# Patient Record
Sex: FEMALE | Race: WHITE | NOT HISPANIC OR LATINO | Employment: UNEMPLOYED | URBAN - NONMETROPOLITAN AREA
[De-identification: names, ages, dates, MRNs, and addresses within clinical notes are randomized per-mention and may not be internally consistent; named-entity substitution may affect disease eponyms.]

---

## 2022-05-27 ENCOUNTER — HOSPITAL ENCOUNTER (INPATIENT)
Facility: HOSPITAL | Age: 37
LOS: 7 days | Discharge: HOME | DRG: 885 | End: 2022-06-03
Attending: EMERGENCY MEDICINE | Admitting: INTERNAL MEDICINE
Payer: MEDICAID

## 2022-05-27 DIAGNOSIS — F22 PARANOID DELUSION: ICD-10-CM

## 2022-05-27 DIAGNOSIS — F15.10 AMPHETAMINE ABUSE: Primary | ICD-10-CM

## 2022-05-27 LAB
ALBUMIN SERPL BCP-MCNC: 3.6 G/DL (ref 3.5–5.2)
ALP SERPL-CCNC: 50 U/L (ref 55–135)
ALT SERPL W/O P-5'-P-CCNC: 17 U/L (ref 10–44)
AMPHET+METHAMPHET UR QL: ABNORMAL
ANION GAP SERPL CALC-SCNC: 8 MMOL/L (ref 8–16)
APAP SERPL-MCNC: <2 UG/ML (ref 10–20)
AST SERPL-CCNC: 9 U/L (ref 10–40)
B-HCG UR QL: NEGATIVE
BACTERIA #/AREA URNS HPF: NEGATIVE /HPF
BARBITURATES UR QL SCN>200 NG/ML: NEGATIVE
BASOPHILS # BLD AUTO: 0.05 K/UL (ref 0–0.2)
BASOPHILS NFR BLD: 0.7 % (ref 0–1.9)
BENZODIAZ UR QL SCN>200 NG/ML: NEGATIVE
BILIRUB SERPL-MCNC: 0.2 MG/DL (ref 0.1–1)
BILIRUB UR QL STRIP: NEGATIVE
BUN SERPL-MCNC: 17 MG/DL (ref 6–20)
BZE UR QL SCN: NEGATIVE
CALCIUM SERPL-MCNC: 8 MG/DL (ref 8.7–10.5)
CANNABINOIDS UR QL SCN: NEGATIVE
CHLORIDE SERPL-SCNC: 111 MMOL/L (ref 95–110)
CLARITY UR: CLEAR
CO2 SERPL-SCNC: 21 MMOL/L (ref 23–29)
COLOR UR: YELLOW
CREAT SERPL-MCNC: 1.2 MG/DL (ref 0.5–1.4)
CREAT UR-MCNC: 264 MG/DL (ref 15–325)
CTP QC/QA: YES
DIFFERENTIAL METHOD: NORMAL
EOSINOPHIL # BLD AUTO: 0.1 K/UL (ref 0–0.5)
EOSINOPHIL NFR BLD: 0.9 % (ref 0–8)
ERYTHROCYTE [DISTWIDTH] IN BLOOD BY AUTOMATED COUNT: 12.5 % (ref 11.5–14.5)
EST. GFR  (AFRICAN AMERICAN): >60 ML/MIN/1.73 M^2
EST. GFR  (NON AFRICAN AMERICAN): 58.7 ML/MIN/1.73 M^2
ETHANOL SERPL-MCNC: <3 MG/DL
GLUCOSE SERPL-MCNC: 93 MG/DL (ref 70–110)
GLUCOSE UR QL STRIP: NEGATIVE
HCT VFR BLD AUTO: 38.8 % (ref 37–48.5)
HGB BLD-MCNC: 13 G/DL (ref 12–16)
HGB UR QL STRIP: ABNORMAL
HYALINE CASTS #/AREA URNS LPF: 10.2 /LPF
IMM GRANULOCYTES # BLD AUTO: 0.01 K/UL (ref 0–0.04)
IMM GRANULOCYTES NFR BLD AUTO: 0.1 % (ref 0–0.5)
KETONES UR QL STRIP: ABNORMAL
LEUKOCYTE ESTERASE UR QL STRIP: NEGATIVE
LYMPHOCYTES # BLD AUTO: 2.6 K/UL (ref 1–4.8)
LYMPHOCYTES NFR BLD: 34.6 % (ref 18–48)
MCH RBC QN AUTO: 29.3 PG (ref 27–31)
MCHC RBC AUTO-ENTMCNC: 33.5 G/DL (ref 32–36)
MCV RBC AUTO: 87 FL (ref 82–98)
METHADONE UR QL SCN>300 NG/ML: NEGATIVE
MICROSCOPIC COMMENT: ABNORMAL
MONOCYTES # BLD AUTO: 0.4 K/UL (ref 0.3–1)
MONOCYTES NFR BLD: 5.8 % (ref 4–15)
NEUTROPHILS # BLD AUTO: 4.4 K/UL (ref 1.8–7.7)
NEUTROPHILS NFR BLD: 57.9 % (ref 38–73)
NITRITE UR QL STRIP: NEGATIVE
NRBC BLD-RTO: 0 /100 WBC
OPIATES UR QL SCN: NEGATIVE
PCP UR QL SCN>25 NG/ML: NEGATIVE
PH UR STRIP: 5 [PH] (ref 5–8)
PLATELET # BLD AUTO: 274 K/UL (ref 150–450)
PMV BLD AUTO: 9.6 FL (ref 9.2–12.9)
POTASSIUM SERPL-SCNC: 3.8 MMOL/L (ref 3.5–5.1)
PROT SERPL-MCNC: 7 G/DL (ref 6–8.4)
PROT UR QL STRIP: ABNORMAL
RBC # BLD AUTO: 4.44 M/UL (ref 4–5.4)
RBC #/AREA URNS HPF: 3 /HPF (ref 0–4)
SARS-COV-2 RDRP RESP QL NAA+PROBE: NEGATIVE
SODIUM SERPL-SCNC: 140 MMOL/L (ref 136–145)
SP GR UR STRIP: >=1.03 (ref 1–1.03)
SQUAMOUS #/AREA URNS HPF: 3 /HPF
TOXICOLOGY INFORMATION: ABNORMAL
TSH SERPL DL<=0.005 MIU/L-ACNC: 1.13 UIU/ML (ref 0.4–4)
URN SPEC COLLECT METH UR: ABNORMAL
UROBILINOGEN UR STRIP-ACNC: 1 EU/DL
WBC # BLD AUTO: 7.63 K/UL (ref 3.9–12.7)
WBC #/AREA URNS HPF: 3 /HPF (ref 0–5)

## 2022-05-27 PROCEDURE — 63600175 PHARM REV CODE 636 W HCPCS: Performed by: EMERGENCY MEDICINE

## 2022-05-27 PROCEDURE — 96372 THER/PROPH/DIAG INJ SC/IM: CPT | Performed by: EMERGENCY MEDICINE

## 2022-05-27 PROCEDURE — 84443 ASSAY THYROID STIM HORMONE: CPT | Performed by: EMERGENCY MEDICINE

## 2022-05-27 PROCEDURE — 81000 URINALYSIS NONAUTO W/SCOPE: CPT | Mod: 59 | Performed by: EMERGENCY MEDICINE

## 2022-05-27 PROCEDURE — 80143 DRUG ASSAY ACETAMINOPHEN: CPT | Performed by: EMERGENCY MEDICINE

## 2022-05-27 PROCEDURE — 82077 ASSAY SPEC XCP UR&BREATH IA: CPT | Performed by: EMERGENCY MEDICINE

## 2022-05-27 PROCEDURE — 36415 COLL VENOUS BLD VENIPUNCTURE: CPT | Performed by: EMERGENCY MEDICINE

## 2022-05-27 PROCEDURE — 99285 EMERGENCY DEPT VISIT HI MDM: CPT | Mod: 25

## 2022-05-27 PROCEDURE — 80307 DRUG TEST PRSMV CHEM ANLYZR: CPT | Performed by: EMERGENCY MEDICINE

## 2022-05-27 PROCEDURE — 81025 URINE PREGNANCY TEST: CPT | Performed by: EMERGENCY MEDICINE

## 2022-05-27 PROCEDURE — U0002 COVID-19 LAB TEST NON-CDC: HCPCS | Performed by: EMERGENCY MEDICINE

## 2022-05-27 PROCEDURE — 80053 COMPREHEN METABOLIC PANEL: CPT | Performed by: EMERGENCY MEDICINE

## 2022-05-27 PROCEDURE — 11400000 HC PSYCH PRIVATE ROOM

## 2022-05-27 PROCEDURE — 85025 COMPLETE CBC W/AUTO DIFF WBC: CPT | Performed by: EMERGENCY MEDICINE

## 2022-05-27 RX ORDER — IBUPROFEN 200 MG
1 TABLET ORAL DAILY PRN
Status: DISCONTINUED | OUTPATIENT
Start: 2022-05-28 | End: 2022-05-28

## 2022-05-27 RX ORDER — OLANZAPINE 10 MG/1
10 TABLET ORAL EVERY 8 HOURS PRN
Status: DISCONTINUED | OUTPATIENT
Start: 2022-05-27 | End: 2022-06-03 | Stop reason: HOSPADM

## 2022-05-27 RX ORDER — LOPERAMIDE HYDROCHLORIDE 2 MG/1
2 CAPSULE ORAL
Status: DISCONTINUED | OUTPATIENT
Start: 2022-05-27 | End: 2022-06-03 | Stop reason: HOSPADM

## 2022-05-27 RX ORDER — MAG HYDROX/ALUMINUM HYD/SIMETH 200-200-20
30 SUSPENSION, ORAL (FINAL DOSE FORM) ORAL EVERY 6 HOURS PRN
Status: DISCONTINUED | OUTPATIENT
Start: 2022-05-27 | End: 2022-06-03 | Stop reason: HOSPADM

## 2022-05-27 RX ORDER — FOLIC ACID 1 MG/1
1 TABLET ORAL DAILY
Status: DISCONTINUED | OUTPATIENT
Start: 2022-05-28 | End: 2022-06-03 | Stop reason: HOSPADM

## 2022-05-27 RX ORDER — HYDROXYZINE PAMOATE 50 MG/1
50 CAPSULE ORAL NIGHTLY PRN
Status: DISCONTINUED | OUTPATIENT
Start: 2022-05-27 | End: 2022-06-03 | Stop reason: HOSPADM

## 2022-05-27 RX ORDER — ZIPRASIDONE MESYLATE 20 MG/ML
20 INJECTION, POWDER, LYOPHILIZED, FOR SOLUTION INTRAMUSCULAR
Status: COMPLETED | OUTPATIENT
Start: 2022-05-27 | End: 2022-05-27

## 2022-05-27 RX ORDER — OLANZAPINE 10 MG/2ML
10 INJECTION, POWDER, FOR SOLUTION INTRAMUSCULAR EVERY 8 HOURS PRN
Status: DISCONTINUED | OUTPATIENT
Start: 2022-05-27 | End: 2022-06-03 | Stop reason: HOSPADM

## 2022-05-27 RX ORDER — DOCUSATE SODIUM 100 MG/1
100 CAPSULE, LIQUID FILLED ORAL DAILY PRN
Status: DISCONTINUED | OUTPATIENT
Start: 2022-05-27 | End: 2022-06-03 | Stop reason: HOSPADM

## 2022-05-27 RX ORDER — ACETAMINOPHEN 325 MG/1
650 TABLET ORAL EVERY 6 HOURS PRN
Status: DISCONTINUED | OUTPATIENT
Start: 2022-05-27 | End: 2022-06-03 | Stop reason: HOSPADM

## 2022-05-27 RX ADMIN — ZIPRASIDONE MESYLATE 20 MG: 20 INJECTION, POWDER, LYOPHILIZED, FOR SOLUTION INTRAMUSCULAR at 07:05

## 2022-05-27 NOTE — ED NOTES
Pt arrived in the ED escorted by an OPC signed by her sister who reports that pt is paranoid and delusional.  Pt is reluctantly cooperative with staff.

## 2022-05-28 PROBLEM — F22 PARANOID DELUSION: Status: ACTIVE | Noted: 2022-05-28

## 2022-05-28 PROBLEM — F15.10 AMPHETAMINE ABUSE: Status: ACTIVE | Noted: 2022-05-28

## 2022-05-28 LAB
CHOLEST SERPL-MCNC: 126 MG/DL (ref 120–199)
CHOLEST/HDLC SERPL: 1.9 {RATIO} (ref 2–5)
ESTIMATED AVG GLUCOSE: 100 MG/DL (ref 68–131)
HBA1C MFR BLD: 5.1 % (ref 4–5.6)
HDLC SERPL-MCNC: 66 MG/DL (ref 40–75)
HDLC SERPL: 52.4 % (ref 20–50)
LDLC SERPL CALC-MCNC: 50.6 MG/DL (ref 63–159)
NONHDLC SERPL-MCNC: 60 MG/DL
TRIGL SERPL-MCNC: 47 MG/DL (ref 30–150)

## 2022-05-28 PROCEDURE — 83036 HEMOGLOBIN GLYCOSYLATED A1C: CPT | Performed by: INTERNAL MEDICINE

## 2022-05-28 PROCEDURE — 36415 COLL VENOUS BLD VENIPUNCTURE: CPT | Performed by: INTERNAL MEDICINE

## 2022-05-28 PROCEDURE — 25000003 PHARM REV CODE 250: Performed by: INTERNAL MEDICINE

## 2022-05-28 PROCEDURE — 80061 LIPID PANEL: CPT | Performed by: INTERNAL MEDICINE

## 2022-05-28 PROCEDURE — 25000003 PHARM REV CODE 250: Performed by: PSYCHIATRY & NEUROLOGY

## 2022-05-28 PROCEDURE — 90792 PR PSYCHIATRIC DIAGNOSTIC EVALUATION W/MEDICAL SERVICES: ICD-10-PCS | Mod: AF,HB,, | Performed by: PSYCHIATRY & NEUROLOGY

## 2022-05-28 PROCEDURE — 90792 PSYCH DIAG EVAL W/MED SRVCS: CPT | Mod: AF,HB,, | Performed by: PSYCHIATRY & NEUROLOGY

## 2022-05-28 PROCEDURE — 11400000 HC PSYCH PRIVATE ROOM

## 2022-05-28 RX ORDER — HYDROXYZINE PAMOATE 25 MG/1
25 CAPSULE ORAL EVERY 8 HOURS PRN
Status: DISCONTINUED | OUTPATIENT
Start: 2022-05-28 | End: 2022-06-03 | Stop reason: HOSPADM

## 2022-05-28 RX ORDER — ESCITALOPRAM OXALATE 5 MG/1
5 TABLET ORAL DAILY
Status: DISCONTINUED | OUTPATIENT
Start: 2022-05-28 | End: 2022-05-30

## 2022-05-28 RX ADMIN — HYDROXYZINE PAMOATE 50 MG: 50 CAPSULE ORAL at 08:05

## 2022-05-28 RX ADMIN — FOLIC ACID 1 MG: 1 TABLET ORAL at 08:05

## 2022-05-28 RX ADMIN — THERA TABS 1 TABLET: TAB at 08:05

## 2022-05-28 RX ADMIN — ESCITALOPRAM OXALATE 5 MG: 5 TABLET, FILM COATED ORAL at 12:05

## 2022-05-28 NOTE — PLAN OF CARE
POC reviewed this shift and is on going. Patient is withdrawn, depressed, anxious, irritable, showing pressured speech, and paranoid. Endorses Auditory Hallucinations, Visual Hallucinations, and Delusions. Denies Suicidal Ideation, Homicidal Ideation, Tactile Hallucinations, and Gustatory Hallucinations. Isolating to self,minimal interaction with peers. Pt continues to be medication compliant and staff will continue to monitor pt closely while on the unit.

## 2022-05-28 NOTE — SUBJECTIVE & OBJECTIVE
History reviewed. No pertinent past medical history.    History reviewed. No pertinent surgical history.    Review of patient's allergies indicates:  No Known Allergies    No current facility-administered medications on file prior to encounter.     No current outpatient medications on file prior to encounter.     Family History    None       Tobacco Use    Smoking status: Never Smoker    Smokeless tobacco: Never Used   Substance and Sexual Activity    Alcohol use: Not on file    Drug use: Not on file    Sexual activity: Not on file     Review of Systems   Unable to perform ROS: Psychiatric disorder   Objective:     Vital Signs (Most Recent):  Temp: 98.2 °F (36.8 °C) (05/28/22 0747)  Pulse: (!) 58 (05/28/22 0747)  Resp: 20 (05/28/22 0747)  BP: (!) 87/52 (05/28/22 0747)  SpO2: 100 % (05/28/22 0747)   Vital Signs (24h Range):  Temp:  [96.8 °F (36 °C)-98.2 °F (36.8 °C)] 98.2 °F (36.8 °C)  Pulse:  [58-86] 58  Resp:  [14-20] 20  SpO2:  [98 %-100 %] 100 %  BP: ()/(52-86) 87/52     Weight: 54.4 kg (119 lb 14.9 oz)  Body mass index is 20.59 kg/m².    Physical Exam  Vitals and nursing note reviewed.   HENT:      Head: Normocephalic and atraumatic.      Nose: No congestion or rhinorrhea.      Mouth/Throat:      Mouth: Mucous membranes are moist.      Pharynx: Oropharynx is clear.   Eyes:      Extraocular Movements: Extraocular movements intact.   Cardiovascular:      Rate and Rhythm: Bradycardia present.   Pulmonary:      Effort: No respiratory distress.      Breath sounds: No wheezing or rhonchi.   Abdominal:      General: Bowel sounds are normal.      Palpations: Abdomen is soft.   Skin:     General: Skin is warm and dry.   Neurological:      General: No focal deficit present.      Mental Status: She is alert. Mental status is at baseline.           Significant Labs: All pertinent labs within the past 24 hours have been reviewed.  Recent Lab Results  (Last 5 results in the past 24 hours)        05/28/22  0531    05/27/22 2058 05/27/22 2046 05/27/22 2014 05/27/22 2007        Benzodiazepines         Negative       Methadone metabolites         Negative       Phencyclidine         Negative       Acetaminophen (Tylenol), Serum       <2.0  Comment: Toxic Levels:  Adults (4 hr post-ingestion).........>150 ug/mL  Adults (12 hr post-ingestion)........>40 ug/mL  Peds (2 hr post-ingestion, liquid)...>225 ug/mL           Albumin       3.6         Alcohol, Serum       <3         Alkaline Phosphatase       50         ALT       17         Amphetamine Screen, Ur         Presumptive Positive       Anion Gap       8         Appearance, UA         Clear       AST       9         Bacteria, UA         Negative       Barbiturate Screen, Ur         Negative       Baso #       0.05         Basophil %       0.7         Bilirubin (UA)         Negative       BILIRUBIN TOTAL       0.2  Comment: For infants and newborns, interpretation of results should be based  on gestational age, weight and in agreement with clinical  observations.    Premature Infant recommended reference ranges:  Up to 24 hours.............<8.0 mg/dL  Up to 48 hours............<12.0 mg/dL  3-5 days..................<15.0 mg/dL  6-29 days.................<15.0 mg/dL    For patients on Eltrombopag therapy, use of Dimension [place] TBIL is   not   recommended.           BUN       17         Calcium       8.0         Chloride       111         Cholesterol 126  Comment: The National Cholesterol Education Program (NCEP) has set the  following guidelines (reference ranges) for Cholesterol:  Optimal.....................<200 mg/dL  Borderline High.............200-239 mg/dL  High........................> or = 240 mg/dL                 CO2       21         Cocaine (Metab.)         Negative       Color, UA         Yellow       Creatinine       1.2         Creatinine, Urine         264.0       Differential Method       Automated         eGFR if        >60.0          eGFR if non        58.7  Comment: Calculation used to obtain the estimated glomerular filtration  rate (eGFR) is the CKD-EPI equation.            Eos #       0.1         Eosinophil %       0.9         Estimated Avg Glucose 100               Glucose       93         Glucose, UA         Negative       Gran # (ANC)       4.4         Gran %       57.9         HDL 66  Comment: The National Cholesterol Education Program (NCEP) has set the  following guidelines (reference values) for HDL Cholesterol:  Low...............<40 mg/dL  Optimal...........>60 mg/dL                 HDL/Cholesterol Ratio 52.4               Hematocrit       38.8         Hemoglobin       13.0         Hemoglobin A1C External 5.1  Comment: ADA Screening Guidelines:  5.7-6.4%  Consistent with prediabetes  >or=6.5%  Consistent with diabetes    High levels of fetal hemoglobin interfere with the HbA1C  assay. Heterozygous hemoglobin variants (HbS, HgC, etc)do  not significantly interfere with this assay.   However, presence of multiple variants may affect accuracy.                 Hyaline Casts, UA         10.2       Immature Grans (Abs)       0.01  Comment: Mild elevation in immature granulocytes is non specific and   can be seen in a variety of conditions including stress response,   acute inflammation, trauma and pregnancy. Correlation with other   laboratory and clinical findings is essential.           Immature Granulocytes       0.1         Ketones, UA         Trace       LDL Cholesterol External 50.6  Comment: The National Cholesterol Education Program (NCEP) has set the  following guidelines (reference values) for LDL Cholesterol:  Optimal.......................<130 mg/dL  Borderline High...............130-159 mg/dL  High..........................160-189 mg/dL  Very High.....................>190 mg/dL                 Leukocytes, UA         Negative       Lymph #       2.6         Lymph %       34.6         MCH       29.3         MCHC        33.5         MCV       87         Microscopic Comment         SEE COMMENT  Comment: Other formed elements not mentioned in the report are not   present in the microscopic examination.          Mono #       0.4         Mono %       5.8         MPV       9.6         NITRITE UA         Negative       Non-HDL Cholesterol 60  Comment: Risk category and Non-HDL cholesterol goals:  Coronary heart disease (CHD)or equivalent (10-year risk of CHD >20%):  Non-HDL cholesterol goal     <130 mg/dL  Two or more CHD risk factors and 10-year risk of CHD <= 20%:  Non-HDL cholesterol goal     <160 mg/dL  0 to 1 CHD risk factor:  Non-HDL cholesterol goal     <190 mg/dL                 nRBC       0         Occult Blood UA         Trace       Opiate Scrn, Ur         Negative       pH, UA         5.0       Platelets       274         Potassium       3.8         Preg Test, Ur   Negative             PROTEIN TOTAL       7.0         Protein, UA         1+  Comment: Recommend a 24 hour urine protein or a urine   protein/creatinine ratio if globulin induced proteinuria is  clinically suspected.          Acceptable     Yes           RBC       4.44         RBC, UA         3       RDW       12.5         SARS-CoV-2 RNA, Amplification, Qual     Negative           Sodium       140         Specific Gravity, UA         >=1.030       Specimen UA         Urine, Clean Catch       Squam Epithel, UA         3       Marijuana (THC) Metabolite         Negative       Total Cholesterol/HDL Ratio 1.9               Toxicology Information         SEE COMMENT  Comment: This screen includes the following classes of drugs at the   listed cut-off:    Benzodiazepines                  200 ng/ml  Methadone                        300 ng/ml  Cocaine metabolite               300 ng/ml  Opiates                         2000 ng/ml  Barbiturates                     200 ng/ml  Amphetamines                    1000 ng/ml  Marijuana metabs (THC)            50  ng/ml  Phencyclidine (PCP)               25 ng/ml    **Intended use : The UDS methods provide only a preliminary result.    A more   specific alternate chemical method must be used in order to obtain a   confirmed analytical result.  Gas chromatography mass spectrometry   (GC/MS)   is the preferred confirmatory method.  Clinical consideration and   professional judgement should be applied to any drug of abuse test   result.    Particularly when preliminary results are used.       ** Results:  Positive results are only preliminary and only suggest   the   sample may contain the analyte being tested.  Negative results   indicate that   the sample does not contain the analyte or the analyte is present in   concentrations below the cut-off level.         Triglycerides 47  Comment: The National Cholesterol Education Program (NCEP) has set the  following guidelines (reference values) for triglycerides:  Normal......................<150 mg/dL  Borderline High.............150-199 mg/dL  High........................200-499 mg/dL                 TSH       1.130  Comment: ATTENTION: The use of Biotin Supplements may interfere with this   assay.           UROBILINOGEN UA         1.0       WBC, UA         3       WBC       7.63                                Significant Imaging: I have reviewed all pertinent imaging results/findings within the past 24 hours.

## 2022-05-28 NOTE — PLAN OF CARE
35 y/o F OPC'ed by sister and was brought to [facility] ED.   Her sister reports to ED staff that she suspects pt is doing drugs and has a hx of substance abuse and anxiety. Pt denies hx of psych and substance abuse. + meth only. She went to an OP clinic x1 and did not go back. She is paranoid and delusional hearing her son crying and other voices. Pt son was taken from her by his father who she was supposed to be sharing custody with in [place] where she has been living for several yrs. Came back to [place] in January with her sister. Sister stated she threatened to harm their mother and she do not know if she would.    Pt cooperative when she came on the unit. Proscan was performed by Franky magaña and Shaista with neg findings. ED tech accompanied them in transferring pt to [facility].
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
## 2022-05-28 NOTE — H&P
[facility]) Hospital Medicine  History & Physical    Patient Name: Kristina Vizcarra  MRN: [id number]  Patient Class: IP- Psych  Admission Date: 5/27/2022  Attending Physician: Gurmeet Lobato DO   Primary Care Provider: Primary Doctor No         Patient information was obtained from patient and ER records.     Subjective:     Principal Problem:<principal problem not specified>    Chief Complaint:   Chief Complaint   Patient presents with    Psychiatric Evaluation     Pt arrived escorted by Noah Snyder [facility] Deputy who executed an OPC.  Pt denies mental health problems at this time and is minimally cooperative with staff.           HPI: Kristina Vizcarra is a 36 y.o. female with PMHX of anxiety who presents to the emergency department C/O psychiatric disease.  Patient brought in under an order of protective custody.  Patient denied any suicide ideation homicide ideation, had denied any paranoid symptoms, patient reported to ED staff that she was living in [place] with she lost custody of her son, moved to [place] to be near her family, patient's sister reported that she had been acting paranoid, thinks that the police an FBI after her, patient had endorsed to family members having auditory hallucination, threatened her mother.  Patient admitted for psychiatric evaluation and therapy      History reviewed. No pertinent past medical history.    History reviewed. No pertinent surgical history.    Review of patient's allergies indicates:  No Known Allergies    No current facility-administered medications on file prior to encounter.     No current outpatient medications on file prior to encounter.     Family History    None       Tobacco Use    Smoking status: Never Smoker    Smokeless tobacco: Never Used   Substance and Sexual Activity    Alcohol use: Not on file    Drug use: Not on file    Sexual activity: Not on file     Review of Systems   Unable to perform ROS:  Psychiatric disorder   Objective:     Vital Signs (Most Recent):  Temp: 98.2 °F (36.8 °C) (05/28/22 0747)  Pulse: (!) 58 (05/28/22 0747)  Resp: 20 (05/28/22 0747)  BP: (!) 87/52 (05/28/22 0747)  SpO2: 100 % (05/28/22 0747)   Vital Signs (24h Range):  Temp:  [96.8 °F (36 °C)-98.2 °F (36.8 °C)] 98.2 °F (36.8 °C)  Pulse:  [58-86] 58  Resp:  [14-20] 20  SpO2:  [98 %-100 %] 100 %  BP: ()/(52-86) 87/52     Weight: 54.4 kg (119 lb 14.9 oz)  Body mass index is 20.59 kg/m².    Physical Exam  Vitals and nursing note reviewed.   HENT:      Head: Normocephalic and atraumatic.      Nose: No congestion or rhinorrhea.      Mouth/Throat:      Mouth: Mucous membranes are moist.      Pharynx: Oropharynx is clear.   Eyes:      Extraocular Movements: Extraocular movements intact.   Cardiovascular:      Rate and Rhythm: Bradycardia present.   Pulmonary:      Effort: No respiratory distress.      Breath sounds: No wheezing or rhonchi.   Abdominal:      General: Bowel sounds are normal.      Palpations: Abdomen is soft.   Skin:     General: Skin is warm and dry.   Neurological:      General: No focal deficit present.      Mental Status: She is alert. Mental status is at baseline.           Significant Labs: All pertinent labs within the past 24 hours have been reviewed.  Recent Lab Results  (Last 5 results in the past 24 hours)        05/28/22  0557   05/27/22 2058 05/27/22 2046 05/27/22 2014 05/27/22  2007        Benzodiazepines         Negative       Methadone metabolites         Negative       Phencyclidine         Negative       Acetaminophen (Tylenol), Serum       <2.0  Comment: Toxic Levels:  Adults (4 hr post-ingestion).........>150 ug/mL  Adults (12 hr post-ingestion)........>40 ug/mL  Peds (2 hr post-ingestion, liquid)...>225 ug/mL           Albumin       3.6         Alcohol, Serum       <3         Alkaline Phosphatase       50         ALT       17         Amphetamine Screen, Ur         Presumptive Positive        Anion Gap       8         Appearance, UA         Clear       AST       9         Bacteria, UA         Negative       Barbiturate Screen, Ur         Negative       Baso #       0.05         Basophil %       0.7         Bilirubin (UA)         Negative       BILIRUBIN TOTAL       0.2  Comment: For infants and newborns, interpretation of results should be based  on gestational age, weight and in agreement with clinical  observations.    Premature Infant recommended reference ranges:  Up to 24 hours.............<8.0 mg/dL  Up to 48 hours............<12.0 mg/dL  3-5 days..................<15.0 mg/dL  6-29 days.................<15.0 mg/dL    For patients on Eltrombopag therapy, use of Dimension Stanley TBIL is   not   recommended.           BUN       17         Calcium       8.0         Chloride       111         Cholesterol 126  Comment: The National Cholesterol Education Program (NCEP) has set the  following guidelines (reference ranges) for Cholesterol:  Optimal.....................<200 mg/dL  Borderline High.............200-239 mg/dL  High........................> or = 240 mg/dL                 CO2       21         Cocaine (Metab.)         Negative       Color, UA         Yellow       Creatinine       1.2         Creatinine, Urine         264.0       Differential Method       Automated         eGFR if        >60.0         eGFR if non        58.7  Comment: Calculation used to obtain the estimated glomerular filtration  rate (eGFR) is the CKD-EPI equation.            Eos #       0.1         Eosinophil %       0.9         Estimated Avg Glucose 100               Glucose       93         Glucose, UA         Negative       Gran # (ANC)       4.4         Gran %       57.9         HDL 66  Comment: The National Cholesterol Education Program (NCEP) has set the  following guidelines (reference values) for HDL Cholesterol:  Low...............<40 mg/dL  Optimal...........>60 mg/dL                  HDL/Cholesterol Ratio 52.4               Hematocrit       38.8         Hemoglobin       13.0         Hemoglobin A1C External 5.1  Comment: ADA Screening Guidelines:  5.7-6.4%  Consistent with prediabetes  >or=6.5%  Consistent with diabetes    High levels of fetal hemoglobin interfere with the HbA1C  assay. Heterozygous hemoglobin variants (HbS, HgC, etc)do  not significantly interfere with this assay.   However, presence of multiple variants may affect accuracy.                 Hyaline Casts, UA         10.2       Immature Grans (Abs)       0.01  Comment: Mild elevation in immature granulocytes is non specific and   can be seen in a variety of conditions including stress response,   acute inflammation, trauma and pregnancy. Correlation with other   laboratory and clinical findings is essential.           Immature Granulocytes       0.1         Ketones, UA         Trace       LDL Cholesterol External 50.6  Comment: The National Cholesterol Education Program (NCEP) has set the  following guidelines (reference values) for LDL Cholesterol:  Optimal.......................<130 mg/dL  Borderline High...............130-159 mg/dL  High..........................160-189 mg/dL  Very High.....................>190 mg/dL                 Leukocytes, UA         Negative       Lymph #       2.6         Lymph %       34.6         MCH       29.3         MCHC       33.5         MCV       87         Microscopic Comment         SEE COMMENT  Comment: Other formed elements not mentioned in the report are not   present in the microscopic examination.          Mono #       0.4         Mono %       5.8         MPV       9.6         NITRITE UA         Negative       Non-HDL Cholesterol 60  Comment: Risk category and Non-HDL cholesterol goals:  Coronary heart disease (CHD)or equivalent (10-year risk of CHD >20%):  Non-HDL cholesterol goal     <130 mg/dL  Two or more CHD risk factors and 10-year risk of CHD <= 20%:  Non-HDL cholesterol goal      <160 mg/dL  0 to 1 CHD risk factor:  Non-HDL cholesterol goal     <190 mg/dL                 nRBC       0         Occult Blood UA         Trace       Opiate Scrn, Ur         Negative       pH, UA         5.0       Platelets       274         Potassium       3.8         Preg Test, Ur   Negative             PROTEIN TOTAL       7.0         Protein, UA         1+  Comment: Recommend a 24 hour urine protein or a urine   protein/creatinine ratio if globulin induced proteinuria is  clinically suspected.          Acceptable     Yes           RBC       4.44         RBC, UA         3       RDW       12.5         SARS-CoV-2 RNA, Amplification, Qual     Negative           Sodium       140         Specific Gravity, UA         >=1.030       Specimen UA         Urine, Clean Catch       Squam Epithel, UA         3       Marijuana (THC) Metabolite         Negative       Total Cholesterol/HDL Ratio 1.9               Toxicology Information         SEE COMMENT  Comment: This screen includes the following classes of drugs at the   listed cut-off:    Benzodiazepines                  200 ng/ml  Methadone                        300 ng/ml  Cocaine metabolite               300 ng/ml  Opiates                         2000 ng/ml  Barbiturates                     200 ng/ml  Amphetamines                    1000 ng/ml  Marijuana metabs (THC)            50 ng/ml  Phencyclidine (PCP)               25 ng/ml    **Intended use : The UDS methods provide only a preliminary result.    A more   specific alternate chemical method must be used in order to obtain a   confirmed analytical result.  Gas chromatography mass spectrometry   (GC/MS)   is the preferred confirmatory method.  Clinical consideration and   professional judgement should be applied to any drug of abuse test   result.    Particularly when preliminary results are used.       ** Results:  Positive results are only preliminary and only suggest   the   sample may contain the  analyte being tested.  Negative results   indicate that   the sample does not contain the analyte or the analyte is present in   concentrations below the cut-off level.         Triglycerides 47  Comment: The National Cholesterol Education Program (NCEP) has set the  following guidelines (reference values) for triglycerides:  Normal......................<150 mg/dL  Borderline High.............150-199 mg/dL  High........................200-499 mg/dL                 TSH       1.130  Comment: ATTENTION: The use of Biotin Supplements may interfere with this   assay.           UROBILINOGEN UA         1.0       WBC, UA         3       WBC       7.63                                Significant Imaging: I have reviewed all pertinent imaging results/findings within the past 24 hours.    Assessment/Plan:     Amphetamine abuse  Counseled on cessation      Paranoid delusion  Patient admitted for psychiatric evaluation and medication management, currently PECd, continue therapy in medication per psych        VTE Risk Mitigation (From admission, onward)         Ordered     IP VTE LOW RISK PATIENT  Once         05/27/22 2050                   Layo Patel MD  Department of Hospital Medicine   [facility] ([facility])

## 2022-05-28 NOTE — ED PROVIDER NOTES
EMERGENCY DEPARTMENT HISTORY AND PHYSICAL EXAM          Date: 5/27/2022   Patient Name: Kristina Vizcarra       History of Presenting Illness           Chief Complaint   Patient presents with    Psychiatric Evaluation     Pt arrived escorted by Noah [facility] Deputy who executed an OPC.  Pt denies mental health problems at this time and is minimally cooperative with staff.            History Provided By: Patient, Family Member and OPC    1908   Kristina Vizcarra is a 36 y.o. female with PMHX of anxiety who presents to the emergency department C/O psychiatric disease.    Patient arrives to the emergency department under an order of protective custody followed by her sister    In the emergency department patient is not very forthcoming.  She states she is not sure what her sister followed at paperwork.  She denies to me suicidal ideation or homicidal ideation.  She denies any paranoia.  She tells me that she was living in [place] for the past several years and that her son was taken by his father who she is supposed to share custody with.  She states the flight to [place] and she has not seen her son.  She moved to this area in January and states she is originally from [place].  She tells me she lives with her uncle and typically has been living with various family members.  Her sister who followed the paperwork she saw this afternoon and she says she last saw her about 3 days ago.  She denies any drug or alcohol use.  She denies history of seen a psychiatrist or any psychiatric hospitalizations.    Per patient's sister, Cate, the patient has been staying with her since January.  She says that she got her sister home from [place] the beginning of this year.  She is not sure what she was doing there and that the patient originally went out there because of her son and his father prior to her son being taken away.  She tells me that the patient is frequently paranoid.  She states that she thinks the  police and the FBI are after her.  She told her the other day that someone put bugs in her ear during her sleep.  She tells me that the patient frequently hears her son crying or other voices are sounds that are not there.  She tells me that she has threatened their mother but is not sure if she would act on these threats.  She tells me that the patient had appointment with [facility] and the patient went for the initial visit but will no longer go back.  She suspects substance abuse reports patient has had a prior history of this.  She tells me that 2 of their aunt's suffer from schizophrenia.      PCP: Primary Doctor No        No current facility-administered medications for this encounter.     No current outpatient medications on file.           Past History     Past Medical History:   No past medical history on file.     Past Surgical History:   No past surgical history on file.     Family History:   No family history on file.     Social History:         Allergies:   Review of patient's allergies indicates:  No Known Allergies       Review of Systems   Review of Systems   Constitutional: Negative for fever.   HENT: Negative for sore throat.    Respiratory: Negative for shortness of breath.    Cardiovascular: Negative for chest pain.   Gastrointestinal: Negative for nausea.   Genitourinary: Negative for dysuria.   Musculoskeletal: Negative for back pain.   Skin: Negative for rash.   Neurological: Negative for weakness.   Hematological: Does not bruise/bleed easily.   Psychiatric/Behavioral: Negative for agitation, decreased concentration, dysphoric mood, hallucinations, self-injury, sleep disturbance and suicidal ideas. The patient is not nervous/anxious and is not hyperactive.    All other systems reviewed and are negative.               Physical Exam     Vitals:    05/27/22 1852 05/27/22 1900   BP: 113/66    BP Location: Right arm    Patient Position: Sitting    Pulse: 86    Resp: 18    Temp: 97.4 °F  "(36.3 °C)    TempSrc: Oral    SpO2: 98%    Weight:  54.4 kg (120 lb)   Height:  5' 4" (1.626 m)      Physical Exam  Vitals and nursing note reviewed.   Constitutional:       General: She is not in acute distress.     Appearance: Normal appearance. She is not ill-appearing.   HENT:      Head: Normocephalic and atraumatic.      Nose: Nose normal. No congestion or rhinorrhea.      Mouth/Throat:      Mouth: Mucous membranes are moist.   Eyes:      Extraocular Movements: Extraocular movements intact.      Pupils: Pupils are equal, round, and reactive to light.   Cardiovascular:      Rate and Rhythm: Regular rhythm.   Pulmonary:      Effort: Pulmonary effort is normal. No respiratory distress.   Musculoskeletal:         General: No tenderness or deformity. Normal range of motion.      Cervical back: Normal range of motion.   Skin:     General: Skin is warm and dry.   Neurological:      General: No focal deficit present.      Mental Status: She is alert and oriented to person, place, and time. Mental status is at baseline.   Psychiatric:         Attention and Perception: Attention and perception normal.         Mood and Affect: Mood normal. Affect is labile.         Speech: Speech normal.         Behavior: Behavior is cooperative.         Thought Content: Thought content does not include homicidal or suicidal ideation.         Cognition and Memory: Cognition normal.      Comments: Patient not very forthcoming.  Denies to me all concerns expressed to her by her sister.              Diagnostic Study Results      Labs -   Recent Results (from the past 12 hour(s))   Urinalysis, Reflex to Urine Culture Urine, Clean Catch    Collection Time: 05/27/22  8:07 PM    Specimen: Urine, Clean Catch   Result Value Ref Range    Specimen UA Urine, Clean Catch     Color, UA Yellow Yellow, Straw, Erika    Appearance, UA Clear Clear    pH, UA 5.0 5.0 - 8.0    Specific Gravity, UA >=1.030 (A) 1.005 - 1.030    Protein, UA 1+ (A) Negative    " Glucose, UA Negative Negative    Ketones, UA Trace (A) Negative    Bilirubin (UA) Negative Negative    Occult Blood UA Trace (A) Negative    Nitrite, UA Negative Negative    Urobilinogen, UA 1.0 <2.0 EU/dL    Leukocytes, UA Negative Negative   Drug screen panel, emergency    Collection Time: 05/27/22  8:07 PM   Result Value Ref Range    Benzodiazepines Negative Negative    Methadone metabolites Negative Negative    Cocaine (Metab.) Negative Negative    Opiate Scrn, Ur Negative Negative    Barbiturate Screen, Ur Negative Negative    Amphetamine Screen, Ur Presumptive Positive (A) Negative    THC Negative Negative    Phencyclidine Negative Negative    Creatinine, Urine 264.0 15.0 - 325.0 mg/dL    Toxicology Information SEE COMMENT    Urinalysis Microscopic    Collection Time: 05/27/22  8:07 PM   Result Value Ref Range    RBC, UA 3 0 - 4 /hpf    WBC, UA 3 0 - 5 /hpf    Bacteria Negative None-Occ /hpf    Squam Epithel, UA 3 /hpf    Hyaline Casts, UA 10.2 (A) 0-1/lpf /lpf    Microscopic Comment SEE COMMENT    CBC auto differential    Collection Time: 05/27/22  8:14 PM   Result Value Ref Range    WBC 7.63 3.90 - 12.70 K/uL    RBC 4.44 4.00 - 5.40 M/uL    Hemoglobin 13.0 12.0 - 16.0 g/dL    Hematocrit 38.8 37.0 - 48.5 %    MCV 87 82 - 98 fL    MCH 29.3 27.0 - 31.0 pg    MCHC 33.5 32.0 - 36.0 g/dL    RDW 12.5 11.5 - 14.5 %    Platelets 274 150 - 450 K/uL    MPV 9.6 9.2 - 12.9 fL    Immature Granulocytes 0.1 0.0 - 0.5 %    Gran # (ANC) 4.4 1.8 - 7.7 K/uL    Immature Grans (Abs) 0.01 0.00 - 0.04 K/uL    Lymph # 2.6 1.0 - 4.8 K/uL    Mono # 0.4 0.3 - 1.0 K/uL    Eos # 0.1 0.0 - 0.5 K/uL    Baso # 0.05 0.00 - 0.20 K/uL    nRBC 0 0 /100 WBC    Gran % 57.9 38.0 - 73.0 %    Lymph % 34.6 18.0 - 48.0 %    Mono % 5.8 4.0 - 15.0 %    Eosinophil % 0.9 0.0 - 8.0 %    Basophil % 0.7 0.0 - 1.9 %    Differential Method Automated    Comprehensive metabolic panel    Collection Time: 05/27/22  8:14 PM   Result Value Ref Range    Sodium 140  136 - 145 mmol/L    Potassium 3.8 3.5 - 5.1 mmol/L    Chloride 111 (H) 95 - 110 mmol/L    CO2 21 (L) 23 - 29 mmol/L    Glucose 93 70 - 110 mg/dL    BUN 17 6 - 20 mg/dL    Creatinine 1.2 0.5 - 1.4 mg/dL    Calcium 8.0 (L) 8.7 - 10.5 mg/dL    Total Protein 7.0 6.0 - 8.4 g/dL    Albumin 3.6 3.5 - 5.2 g/dL    Total Bilirubin 0.2 0.1 - 1.0 mg/dL    Alkaline Phosphatase 50 (L) 55 - 135 U/L    AST 9 (L) 10 - 40 U/L    ALT 17 10 - 44 U/L    Anion Gap 8 8 - 16 mmol/L    eGFR if African American >60.0 >60 mL/min/1.73 m^2    eGFR if non  58.7 (A) >60 mL/min/1.73 m^2   TSH    Collection Time: 05/27/22  8:14 PM   Result Value Ref Range    TSH 1.130 0.400 - 4.000 uIU/mL   Ethanol    Collection Time: 05/27/22  8:14 PM   Result Value Ref Range    Alcohol, Serum <3 <10 mg/dL   Acetaminophen level    Collection Time: 05/27/22  8:14 PM   Result Value Ref Range    Acetaminophen (Tylenol), Serum <2.0 (L) 10.0 - 20.0 ug/mL   POCT COVID-19 Rapid Screening    Collection Time: 05/27/22  8:46 PM   Result Value Ref Range    POC Rapid COVID Negative Negative     Acceptable Yes         Radiologic Studies -    No orders to display        Medications given in the ED-   Medications   ziprasidone injection 20 mg (20 mg Intramuscular Given 5/27/22 1944)           Medical Decision Making    I am the first provider for this patient.     I reviewed the vital signs, available nursing notes, past medical history, past surgical history, family history and social history.     Vital Signs:  Reviewed the patient's vital signs.     Pulse Oximetry Analysis and Interpretation:    98% on Room Air, normal         Records Reviewed: Nursing notes.        Provider Notes (Medical Decision Making): Kristina Vizcarra is a 36 y.o. female here for reported psychiatric illness and paranoia    Patient not very forthcoming with me and denies everything.  She does not want to be here.    Reviewed OPC paperwork and spoke extensively the patient's  sister, Cate, on the phone .  Cate reports that she has been trying to help her sister but no longer knows what to do.  She tells me the patient is unwilling to seek outpatient care and expresses paranoia especially against authority figures, police officers, and taking any sort of medications.      Cate reports concerns about the patient being around her children in that the patient has threatened her mother.  She reports she is under sure if patient actually act on these threats.    Patient's sister seems appropriate, provides a cohesive history, and expresses significant concern for the patients well-being.  Her OPC seems to be filled in good kanika and she appears to be a reliable historian.  As patient seems unable to seek care for herself and appears to be gravely disabled I will plan on putting patient under a protective custody order      Procedures:   Procedures      ED Course:    8:46 PM  Patient is medically cleared for Psych placement  Labs unremarkable, UDS + amphetamines        CONSULT NOTE:    8:51 PM      Dr. Tao discussed care with?Dr Lobato, Psych   It was a standard discussion,?including history of patients chief complaint, available diagnostic results, and treatment course.?Discussed case. Agrees with admission.              Diagnosis and Disposition       CLINICAL IMPRESSION:         1. Amphetamine abuse    2. Paranoid delusion              PLAN:   1. Admit to Hospital  2.      Medication List      You have not been prescribed any medications.        3. No follow-up provider specified.     _______________________________     Please note that this dictation was completed with [employer], the computer voice recognition software.  Quite often unanticipated grammatical, syntax, homophones, and other interpretive errors are inadvertently transcribed by the computer software.  Please disregard these errors.  Please excuse any errors that have escaped final proofreading.             Israel KIM  MD [place]  05/27/22 9598

## 2022-05-28 NOTE — HPI
Kristina Vizcarra is a 36 y.o. female with PMHX of anxiety who presents to the emergency department C/O psychiatric disease.  Patient brought in under an order of protective custody.  Patient denied any suicide ideation homicide ideation, had denied any paranoid symptoms, patient reported to ED staff that she was living in [place] with she lost custody of her son, moved to [place] to be near her family, patient's sister reported that she had been acting paranoid, thinks that the police an FBI after her, patient had endorsed to family members having auditory hallucination, threatened her mother.  Patient admitted for psychiatric evaluation and therapy

## 2022-05-28 NOTE — ED NOTES
Pt notified staff of desire to cooperate with the process.  After carefully reading the PEC, pt states that she does abuse drugs (meth) and alcohol.  Although she denies mental illness, pt states that if substance abuse if a reason for PEC she does have those issues and will therefore cooperate with the process.

## 2022-05-28 NOTE — ASSESSMENT & PLAN NOTE
Patient admitted for psychiatric evaluation and medication management, currently PECd, continue therapy in medication per psych

## 2022-05-29 PROCEDURE — 99232 SBSQ HOSP IP/OBS MODERATE 35: CPT | Mod: AF,HB,, | Performed by: PSYCHIATRY & NEUROLOGY

## 2022-05-29 PROCEDURE — 11400000 HC PSYCH PRIVATE ROOM

## 2022-05-29 PROCEDURE — 25000003 PHARM REV CODE 250: Performed by: INTERNAL MEDICINE

## 2022-05-29 PROCEDURE — 99232 PR SUBSEQUENT HOSPITAL CARE,LEVL II: ICD-10-PCS | Mod: AF,HB,, | Performed by: PSYCHIATRY & NEUROLOGY

## 2022-05-29 RX ADMIN — ESCITALOPRAM OXALATE 5 MG: 5 TABLET, FILM COATED ORAL at 08:05

## 2022-05-29 RX ADMIN — FOLIC ACID 1 MG: 1 TABLET ORAL at 08:05

## 2022-05-29 RX ADMIN — THERA TABS 1 TABLET: TAB at 08:05

## 2022-05-29 RX ADMIN — HYDROXYZINE PAMOATE 50 MG: 50 CAPSULE ORAL at 08:05

## 2022-05-29 NOTE — PLAN OF CARE
POC reviewed this shift and is ongoing.  Pt cooperative this shift with staff and interacts with peers.   Pt took meds with no adverse reactions. Pt denies SI/HI, A/V hallucinations. Will continue to monitor for changes and safety.

## 2022-05-29 NOTE — PLAN OF CARE
POC reviewed this shift and is on going. Patient is withdrawn, calm, cooperative, quiet, anxious, and sleeping. Denies Suicidal Ideation, Homicidal Ideation, Auditory Hallucinations, Visual Hallucinations, Tactile Hallucinations, Gustatory Hallucinations, and Delusions. Patient continues to isolate in her room sleeping during the day. Patient has been communicating with her peers while she is out on the floor. Pt continues to be medication compliant and staff will continue to monitor pt closely while on the unit.
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
## 2022-05-30 PROCEDURE — 25000003 PHARM REV CODE 250: Performed by: INTERNAL MEDICINE

## 2022-05-30 PROCEDURE — 99232 PR SUBSEQUENT HOSPITAL CARE,LEVL II: ICD-10-PCS | Mod: SA,HB,, | Performed by: PSYCHIATRY & NEUROLOGY

## 2022-05-30 PROCEDURE — 11400000 HC PSYCH PRIVATE ROOM

## 2022-05-30 PROCEDURE — 90833 PSYTX W PT W E/M 30 MIN: CPT | Mod: SA,HB,, | Performed by: PSYCHIATRY & NEUROLOGY

## 2022-05-30 PROCEDURE — 90833 PR PSYCHOTHERAPY W/PATIENT W/E&M, 30 MIN (ADD ON): ICD-10-PCS | Mod: SA,HB,, | Performed by: PSYCHIATRY & NEUROLOGY

## 2022-05-30 PROCEDURE — 25000003 PHARM REV CODE 250: Performed by: PSYCHIATRY & NEUROLOGY

## 2022-05-30 PROCEDURE — 99232 SBSQ HOSP IP/OBS MODERATE 35: CPT | Mod: SA,HB,, | Performed by: PSYCHIATRY & NEUROLOGY

## 2022-05-30 RX ORDER — ESCITALOPRAM OXALATE 10 MG/1
10 TABLET ORAL DAILY
Status: DISCONTINUED | OUTPATIENT
Start: 2022-05-31 | End: 2022-06-03 | Stop reason: HOSPADM

## 2022-05-30 RX ORDER — RISPERIDONE 0.5 MG/1
0.5 TABLET ORAL 2 TIMES DAILY
Status: DISCONTINUED | OUTPATIENT
Start: 2022-05-30 | End: 2022-05-31

## 2022-05-30 RX ADMIN — RISPERIDONE 0.5 MG: 0.5 TABLET ORAL at 09:05

## 2022-05-30 RX ADMIN — FOLIC ACID 1 MG: 1 TABLET ORAL at 08:05

## 2022-05-30 RX ADMIN — THERA TABS 1 TABLET: TAB at 08:05

## 2022-05-30 RX ADMIN — ESCITALOPRAM OXALATE 5 MG: 5 TABLET, FILM COATED ORAL at 08:05

## 2022-05-30 RX ADMIN — HYDROXYZINE PAMOATE 50 MG: 50 CAPSULE ORAL at 09:05
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
## 2022-05-30 NOTE — PLAN OF CARE
Pt was encouraged to attend group but refused and stayed in her room.  Staff will continue to encourage pt to participate in process groups to verbalize feelings and develop healthy coping skills.

## 2022-05-30 NOTE — PLAN OF CARE
POC reviewed this shift and is on going.  Pt cooperative with staff and interacts with peers. In dayroom watching tv. Took meds with no adverse reaction. Able to make needs known. No ss of distress. Pt denies SI/HI, A/V hallucinations. Will continue to monitor for changes and safety.

## 2022-05-30 NOTE — PROGRESS NOTES
"PSYCHIATRY DAILY INPATIENT PROGRESS NOTE  SUBSEQUENT HOSPITAL VISIT    ENCOUNTER DATE: 5/30/2022  SITE: [facility]    DATE OF ADMISSION: 5/27/2022  6:45 PM  LENGTH OF STAY: 3 days    CHIEF COMPLAINT   Kristina Vizcarra is a 36 y.o. female, seen during daily gonzalez rounds on the inpatient unit.  Kristina Vizcarra presented with the chief complaint of psychosis    The patient was seen and examined. The chart was reviewed.     Reviewed notes from Rns, MD and SW and labs from the last 24 hours.    The patient's case was discussed with the treatment team/care providers today including Rns, MD and SW    Staff reports no behavioral or management issues.     The patient has been compliant with treatment.    Subjective 05/30/2022    Patient seen today for follow up NP assessment. Patient continues to deny assertions made by her sister in [facility], including recent paranoia, delusions, auditory hallucinations. She reports a poor relationship with her sister, stating "She's just doing this to try and force me into rehab. I don't need rehab. I know what I need and that's to get away from my family." Patient is somewhat inconsistent regarding relations with her family. She adamantly denies ever making threatening statements toward her mother, and initially stated it would be ok for staff to contact her to confirm this. Shortly after, patient states "Actually, my mom and my sister are kind of in cahoots so she might lie too." Throughout assessment, patient implied that she has been living with her uncle for an extended period of time-however, when asked if SW can speak to him for collateral, she stated "I've been staying with him 2 or 3 days."    Patient exhibits increased rate of speech and tangentiality, appears anxious throughout duration of assessment. Continues to deny auditory or visual hallucinations, exhibits paranoia related to her family and accuses them of "conspiring against me" several times throughout assessment.     The patient " denies any side effects to medications.    Psychiatric ROS (observed, reported, or endorsed/denied):  Depressed mood - denies  Interest/pleasure/anhedonia: No  Guilt/hopelessness/worthlessness - No  Changes in Sleep - No  Changes in Appetite - No  Changes in Concentration - No  Changes in Energy - No  PMA/R- No  Suicidal- active/passive ideations - No  Homicidal ideations: active/passive ideations - No    Hallucinations - denies  Delusions - variable  Disorganized behavior - No  Disorganized speech - No  Negative symptoms - No    Elevated mood - observed  Decreased need for sleep - No  Grandiosity - No  Racing thoughts - Yes  Impulsivity - No  Irritability- No  Increased energy - Yes  Distractibility - Variable  Increase in goal-directed activity or PMA- No    Symptoms of ARABELLA - Yes  Symptoms of Panic Disorder- No  Symptoms of PTSD - No    Overall progress: Patient is showing no improvement on the Unit to date    Psychotherapy:  · Target symptoms: alcohol abuse, substance abuse  · Why chosen therapy is appropriate versus another modality: relevant to diagnosis, evidence based practice  · Outcome monitoring methods: self-report, observation  · Therapeutic intervention type: insight oriented psychotherapy, supportive psychotherapy  · Topics discussed/themes: relationships difficulties, building skills sets for symptom management, symptom recognition, substance abuse  · The patient's response to the intervention is guarded. The patient's progress toward treatment goals is not progressing.   · Duration of intervention: 16 minutes.    Medical ROS  Review of Systems   Constitutional: Negative.    HENT: Negative.    Eyes: Negative.    Respiratory: Negative.    Cardiovascular: Negative.    Gastrointestinal: Negative.    Genitourinary: Negative.    Musculoskeletal: Negative.    Skin: Negative.    Neurological: Negative.    Endo/Heme/Allergies: Negative.    Psychiatric/Behavioral:        As noted       PAST MEDICAL HISTORY  "  History reviewed. No pertinent past medical history.    PSYCHOTROPIC MEDICATIONS   Scheduled Meds:   EScitalopram oxalate  5 mg Oral Daily    folic acid  1 mg Oral Daily    multivitamin  1 tablet Oral Daily     Continuous Infusions:  PRN Meds:.acetaminophen, aluminum-magnesium hydroxide-simethicone, docusate sodium, hydrOXYzine pamoate, hydrOXYzine pamoate, loperamide, OLANZapine **AND** OLANZapine    EXAMINATION    VITALS   Vitals:    05/29/22 0727 05/29/22 1915 05/30/22 0700 05/30/22 0800   BP: (!) 104/58 106/61 108/63 108/63   BP Location: Left arm Left arm Right arm Right arm   Patient Position: Sitting Sitting Sitting Sitting   Pulse: 80 80 70 70   Resp: 20 20 18 18   Temp: 98.4 °F (36.9 °C) 98.5 °F (36.9 °C) 97.6 °F (36.4 °C) 97.6 °F (36.4 °C)   TempSrc: Oral Oral Oral Oral   SpO2: 99% 99% 100% 100%   Weight:       Height:           Body mass index is 23.34 kg/m².    CONSTITUTIONAL  General Appearance: unremarkable, age appropriate    MUSCULOSKELETAL  Muscle Strength and Tone:no tremor, no tic  Abnormal Involuntary Movements: No  Gait and Station: non-ataxic    PSYCHIATRIC   Level of Consciousness: awake, alert  and oriented  Orientation: person, place, time and situation  Grooming: Hospital garb and Well groomed  Psychomotor Behavior: cooperative, eye contact normal  Speech: normal tone, normal pitch, normal volume, increased rate  Language: grossly intact  Mood: "Great"  Affect: Anxious  Thought Process: tangential  Associations: intact   Thought Content: DENIES suicidal ideation, DENIES homicidal ideation and paranoia  Perceptions: denies hallucinations  Memory: Able to recall past events, Remote intact and Recent intact  Attention:Impaired to some degree  Fund of Knowledge: Aware of current events and Vocabulary appropriate   Estimate if Intelligence:  Average based on work/education history, vocabulary and mental status exam  Insight: limited awareness of illness  Judgment: behavior is adequate to " circumstances    DIAGNOSTIC TESTING   Laboratory Results  No results found for this or any previous visit (from the past 24 hour(s)).    MEDICAL DECISION MAKING   Psychosis  Anxiety  Amphetamine use  Psychosocial stressors      Psychosis:  -Patient counseled  -R/o substance induced  -Initiate risperdal 0.5 mg BID    Anxiety:  lexapro 5 mg po qam for anxiety---Increase to 10 mg daily tomorrow, 5/31/22  Vistaril 25mg po q 8 hr prn anxiety     methamphetamine use  -Counseled on risks of substance use and tx options including inpt rehab, IOP, outpt therapy  -Patient not interested in rehab at this time           Pt was informed of all the side effects, alternatives, risks and benefits of taking this medicine.  Pt made an informed decision to take these medications.  Pt was able to weigh the risks and benefits and stated that the benefits out weigh the risks at this time.      - Social Work will obtain follow up appointments for patient.      HEALTH SCREENING  Hemoglobin A1c  Lipid Panel     ENCOURAGE MAO MILIEU        NEED FOR CONTINUED HOSPITALIZATION  Psychiatric illness continues to pose a potential threat to life or bodily function, of self or others, thereby requiring the need for continued inpatient psychiatric hospitalization: Yes, due to: hallucinations and gravely disabled, as evidenced by:  Ongoing concerns with perceptual aberrancy and paranoid persecutory delusions leading to potential harm of self or others.     Protective inpatient pyschiatric hospitalization required while a safe disposition plan is enacted: Yes     Patient stabilized and ready for discharge from inpatient psychiatric unit: No     Target Disposition:  home     ESTIMATED 3-5 DAYS TO DISCHARGE     PROGNOSIS: GUARDED    STAFF:   Kennedy Gandhi NP  Psychiatry

## 2022-05-30 NOTE — PLAN OF CARE
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  She says she went to [employer] for nursing school).  According to pt's sister, pt dropped out of school in 9th grade and has worked in home health.     Pt is denying all reasons for admission.    Pt verbally rescinded consent to contact her sister, Cate, because she thinks she is out to get her.  She gave verbal consent to speak with her uncle Kemar Rubalcava, but she does not know his number.     Pt states she does not want anyone in her family to know, but she plans to move to [facility] d/c to move in with her best friend who is a  who is going to help her find a job.  She states the  said to call them when she gets out and they will give her a ride home.

## 2022-05-30 NOTE — PLAN OF CARE
Patient is alert, pleasant, and cooperative. Appetite is good for meals and is medication compliant without side effects noted. Patient endorses depression and anxiety. Denies S/HI, A/VH. No delusions present. No negative behaviors. Patient is isolative to herself. Stayed in her room and refused groups today. Kennedy Gandhi NP visited per telemed with new order Risperdal 0.5 mg po bid, increase dosage Lexapro 10 mg po daily. Patient is in her room at this time asleep and resting comfortably. Close observations continued and safe environment maintained.

## 2022-05-30 NOTE — PLAN OF CARE
SAFETY PLAN   05/30/22 1516   Step 1: Warning Signs   Warning Sign 1 loss of a loved one   Warning Sign 2 losing custody of child   Warning Sign 3 losing sight of what's important   Step 2: Internal coping strategies - Things I can do to take my mind off my problems without contacting another person:   Coping Strategy 1 pray   Coping Strategy 2 Gnosticism   Coping Strategy 3 counseling   Step 3: People and social settings that provide distraction:   1. Name mother   2. Name sister   3. Place outside   4. Place Gnosticism    Step 4: People whom I can ask for help:   1. Person Younger brother (Cesar)   2. Person dad (Addy)       Phone [phone]   3. Person Uncle Kemar   Step 5: Professionals or agencies I can contact during a crisis:   1. Clinician Name [facility]       Phone [phone]   3. Suicide Prevention Lifeline: 2-035-827-TALK (3697) Suicide Prevention Lifeline: 9-987-396-TALK (6093)   Step 6: Making the environment safe:   Safe environment 1 Put my child 1st   Safe environment 2 Stay away from negativity/remember why I'm here and remain focused

## 2022-05-31 PROCEDURE — 25000003 PHARM REV CODE 250: Performed by: INTERNAL MEDICINE

## 2022-05-31 PROCEDURE — 99232 PR SUBSEQUENT HOSPITAL CARE,LEVL II: ICD-10-PCS | Mod: SA,HB,, | Performed by: PSYCHIATRY & NEUROLOGY

## 2022-05-31 PROCEDURE — 90833 PR PSYCHOTHERAPY W/PATIENT W/E&M, 30 MIN (ADD ON): ICD-10-PCS | Mod: SA,HB,, | Performed by: PSYCHIATRY & NEUROLOGY

## 2022-05-31 PROCEDURE — 99232 SBSQ HOSP IP/OBS MODERATE 35: CPT | Mod: SA,HB,, | Performed by: PSYCHIATRY & NEUROLOGY

## 2022-05-31 PROCEDURE — 90833 PSYTX W PT W E/M 30 MIN: CPT | Mod: SA,HB,, | Performed by: PSYCHIATRY & NEUROLOGY

## 2022-05-31 PROCEDURE — 11400000 HC PSYCH PRIVATE ROOM

## 2022-05-31 PROCEDURE — 25000003 PHARM REV CODE 250: Performed by: PSYCHIATRY & NEUROLOGY

## 2022-05-31 RX ORDER — RISPERIDONE 1 MG/1
1 TABLET ORAL 2 TIMES DAILY
Status: DISCONTINUED | OUTPATIENT
Start: 2022-05-31 | End: 2022-06-03 | Stop reason: HOSPADM

## 2022-05-31 RX ADMIN — RISPERIDONE 0.5 MG: 0.5 TABLET ORAL at 08:05

## 2022-05-31 RX ADMIN — RISPERIDONE 1 MG: 1 TABLET ORAL at 09:05

## 2022-05-31 RX ADMIN — FOLIC ACID 1 MG: 1 TABLET ORAL at 08:05

## 2022-05-31 RX ADMIN — THERA TABS 1 TABLET: TAB at 08:05

## 2022-05-31 RX ADMIN — HYDROXYZINE PAMOATE 50 MG: 50 CAPSULE ORAL at 09:05

## 2022-05-31 RX ADMIN — ESCITALOPRAM OXALATE 10 MG: 10 TABLET ORAL at 08:05

## 2022-05-31 NOTE — PLAN OF CARE
Pt. Attended and participated in group therapy. She verbalized three ways to better take care of herself after she discharge from hospital. Pt. Stated the following three ways:  1.) to find a higher power to help her cope.  2.) To cut tides with alcohol and drugs.  3.) To have better boundaries.  Encouraged Pt. To verbalize feelings.

## 2022-05-31 NOTE — PROGRESS NOTES
"PSYCHIATRY DAILY INPATIENT PROGRESS NOTE  SUBSEQUENT HOSPITAL VISIT    ENCOUNTER DATE: 5/31/2022  SITE: [facility]    DATE OF ADMISSION: 5/27/2022  6:45 PM  LENGTH OF STAY: 4 days    CHIEF COMPLAINT   Kristina Vizcarra is a 36 y.o. female, seen during daily gonzalez rounds on the inpatient unit.  Kristina Vizcarra presented with the chief complaint of psychosis    The patient was seen and examined. The chart was reviewed.     Reviewed notes from Rns and SW and labs from the last 24 hours.    The patient's case was discussed with the treatment team/care providers today including Rns, MD and SW    Staff reports no behavioral or management issues.     The patient has been compliant with treatment.    Subjective 05/31/2022     Patient continues to deny any auditory or visual hallucinations. Remains somewhat hyperverbal and tangential during assessment, appears anxious, laughs at inappropriate times during assessment. Continues to verbalize paranoid thoughts about her family "wanting me locked up in here" and states she does not want staff contacting her sister anymore.  Denies any subjective anxiety. Patient reports that after discharge, she plans to "stay with my friend who's an  in [place]." SW unable to reach patient's uncle as the patient is unable to provide a phone number and has rescinded consent to speak to her sister. Reports good sleep last night. Discussed potential SE to risperdal and lexapro, states she is not experiencing any at this time.     The patient denies any side effects to medications.    Psychiatric ROS (observed, reported, or endorsed/denied):  Depressed mood - denies  Interest/pleasure/anhedonia: No  Guilt/hopelessness/worthlessness - No  Changes in Sleep - No  Changes in Appetite - No  Changes in Concentration - Continuing  Changes in Energy - Continuing  PMA/R- No  Suicidal- active/passive ideations - No  Homicidal ideations: active/passive ideations - No    Hallucinations - denies  Delusions " - fluctuating  Disorganized behavior - No  Disorganized speech - No  Negative symptoms - No    Elevated mood - variable  Decreased need for sleep - denies  Grandiosity - No  Racing thoughts - less  Impulsivity - No  Irritability- No  Increased energy - variable  Distractibility - Yes  Increase in goal-directed activity or PMA- No    Symptoms of ARABELLA - Yes  Symptoms of Panic Disorder- No  Symptoms of PTSD - No    Overall progress: Patient is showing mild improvement     Psychotherapy:  · Target symptoms: alcohol abuse, anxiety   · Why chosen therapy is appropriate versus another modality: relevant to diagnosis, evidence based practice  · Outcome monitoring methods: self-report, observation  · Therapeutic intervention type: insight oriented psychotherapy, supportive psychotherapy  · Topics discussed/themes: relationships difficulties, building skills sets for symptom management, symptom recognition  · The patient's response to the intervention is guarded. The patient's progress toward treatment goals is limited.   · Duration of intervention: 16 minutes.    Medical ROS  Review of Systems   Constitutional: Negative.    HENT: Negative.    Eyes: Negative.    Respiratory: Negative.    Cardiovascular: Negative.    Gastrointestinal: Negative.    Genitourinary: Negative.    Musculoskeletal: Negative.    Skin: Negative.    Neurological: Negative.    Endo/Heme/Allergies: Negative.    Psychiatric/Behavioral:        As noted       PAST MEDICAL HISTORY   History reviewed. No pertinent past medical history.    PSYCHOTROPIC MEDICATIONS   Scheduled Meds:   EScitalopram oxalate  10 mg Oral Daily    folic acid  1 mg Oral Daily    multivitamin  1 tablet Oral Daily    risperiDONE  0.5 mg Oral BID     Continuous Infusions:  PRN Meds:.acetaminophen, aluminum-magnesium hydroxide-simethicone, docusate sodium, hydrOXYzine pamoate, hydrOXYzine pamoate, loperamide, OLANZapine **AND** OLANZapine    EXAMINATION    VITALS   Vitals:    05/30/22  0700 05/30/22 0800 05/30/22 1943 05/31/22 0805   BP: 108/63 108/63 105/62 114/64   BP Location: Right arm Right arm  Left arm   Patient Position: Sitting Sitting  Sitting   Pulse: 70 70 80 86   Resp: 18 18 16 18   Temp: 97.6 °F (36.4 °C) 97.6 °F (36.4 °C) 98.3 °F (36.8 °C) 98.1 °F (36.7 °C)   TempSrc: Oral Oral  Oral   SpO2: 100% 100% 98% 100%   Weight:       Height:           Body mass index is 23.34 kg/m².    CONSTITUTIONAL  General Appearance: unremarkable, age appropriate    MUSCULOSKELETAL  Muscle Strength and Tone:no tremor, no tic  Abnormal Involuntary Movements: No  Gait and Station: non-ataxic    PSYCHIATRIC   Level of Consciousness: awake and alert   Orientation: person, place, time and situation  Grooming: Disheveled and Hospital garb  Psychomotor Behavior: cooperative, restless and fidgety   Speech: normal tone, normal pitch, normal volume, increased rate  Language: grossly intact  Mood: fine  Affect: Anxious  Thought Process: tangential  Associations: intact   Thought Content: DENIES suicidal ideation, DENIES homicidal ideation and paranoia  Perceptions: denies hallucinations  Memory: Able to recall past events, Remote intact and Recent intact  Attention:Impaired to some degree  Fund of Knowledge: Aware of current events and Vocabulary appropriate   Estimate if Intelligence:  Average based on work/education history, vocabulary and mental status exam  Insight: limited awareness of illness  Judgment: behavior is adequate to circumstances    DIAGNOSTIC TESTING   Laboratory Results  No results found for this or any previous visit (from the past 24 hour(s)).    MEDICAL DECISION MAKING   Psychosis  Anxiety  Amphetamine use  Psychosocial stressors        Psychosis:  -Patient counseled  -R/o substance induced  -Continue risperdal 0.5 mg, increase to 1 mg BID effective this evening 5/31/22     Anxiety:  lexapro 5 mg po qam for anxiety---Increase to 10 mg daily tomorrow, 5/31/22----Increase to 10 mg  daily  Vistaril 25mg po q 8 hr prn anxiety     methamphetamine use  -Counseled on risks of substance use and tx options including inpt rehab, IOP, outpt therapy  -Patient not interested in rehab at this time           Pt was informed of all the side effects, alternatives, risks and benefits of taking this medicine.  Pt made an informed decision to take these medications.  Pt was able to weigh the risks and benefits and stated that the benefits out weigh the risks at this time.      - Social Work will obtain follow up appointments for patient.      HEALTH SCREENING  Hemoglobin A1c  Lipid Panel     ENCOURAGE MAO MILIEU        NEED FOR CONTINUED HOSPITALIZATION  Psychiatric illness continues to pose a potential threat to life or bodily function, of self or others, thereby requiring the need for continued inpatient psychiatric hospitalization: Yes, due to: hallucinations and gravely disabled, as evidenced by:  Ongoing concerns with perceptual aberrancy and paranoid persecutory delusions leading to potential harm of self or others.     Protective inpatient pyschiatric hospitalization required while a safe disposition plan is enacted: Yes     Patient stabilized and ready for discharge from inpatient psychiatric unit: No     Target Disposition:  home     ESTIMATED 3-5 DAYS TO DISCHARGE     PROGNOSIS: GUARDED  STAFF:   Kennedy Gandhi NP  Psychiatry

## 2022-05-31 NOTE — PLAN OF CARE
Patient is anxious, isolative to self but will communicate when prompted, delusional, paranoid, flight of ideas with thoughts disorganized at times. Patient is cooperative with no negative behaviors. Appetite is good and medication complaint without side effects noted. Kennedy Gandhi NP visited this am with new order to increase dosage of Risperdal 1 mg po bid. Patient is sitting in the dining room at this time eating supper meal. Close observations continued and safe environment maintained.

## 2022-05-31 NOTE — PLAN OF CARE
Pt was encouraged to attend group but refused.  Staff will continue to encourage pt to participate in process groups to verbalize feelings and develop healthy coping skills.

## 2022-06-01 PROCEDURE — 90833 PSYTX W PT W E/M 30 MIN: CPT | Mod: SA,HB,, | Performed by: PSYCHIATRY & NEUROLOGY

## 2022-06-01 PROCEDURE — 11400000 HC PSYCH PRIVATE ROOM

## 2022-06-01 PROCEDURE — 25000003 PHARM REV CODE 250: Performed by: PSYCHIATRY & NEUROLOGY

## 2022-06-01 PROCEDURE — 90833 PR PSYCHOTHERAPY W/PATIENT W/E&M, 30 MIN (ADD ON): ICD-10-PCS | Mod: SA,HB,, | Performed by: PSYCHIATRY & NEUROLOGY

## 2022-06-01 PROCEDURE — 25000003 PHARM REV CODE 250: Performed by: INTERNAL MEDICINE

## 2022-06-01 PROCEDURE — 99232 PR SUBSEQUENT HOSPITAL CARE,LEVL II: ICD-10-PCS | Mod: SA,HB,, | Performed by: PSYCHIATRY & NEUROLOGY

## 2022-06-01 PROCEDURE — 99232 SBSQ HOSP IP/OBS MODERATE 35: CPT | Mod: SA,HB,, | Performed by: PSYCHIATRY & NEUROLOGY

## 2022-06-01 RX ADMIN — ESCITALOPRAM OXALATE 10 MG: 10 TABLET ORAL at 08:06

## 2022-06-01 RX ADMIN — HYDROXYZINE PAMOATE 50 MG: 50 CAPSULE ORAL at 08:06

## 2022-06-01 RX ADMIN — RISPERIDONE 1 MG: 1 TABLET ORAL at 08:06

## 2022-06-01 RX ADMIN — FOLIC ACID 1 MG: 1 TABLET ORAL at 08:06

## 2022-06-01 RX ADMIN — THERA TABS 1 TABLET: TAB at 08:06

## 2022-06-01 NOTE — PLAN OF CARE
Patient continues to have disorganized thoughts; however, remains cooperative. Medication provided to assist with sleeping.  No concerns.

## 2022-06-01 NOTE — PROGRESS NOTES
"PSYCHIATRY DAILY INPATIENT PROGRESS NOTE  SUBSEQUENT HOSPITAL VISIT    ENCOUNTER DATE: 6/1/2022  SITE: [facility]    DATE OF ADMISSION: 5/27/2022  6:45 PM  LENGTH OF STAY: 5 days    CHIEF COMPLAINT   Kristina Vizcarra is a 36 y.o. female, seen during daily gonzalez rounds on the inpatient unit.  Kristina Vizcarra presented with the chief complaint of psychosis    The patient was seen and examined. The chart was reviewed.     Reviewed notes from Rns and MD and labs from the last 24 hours.    The patient's case was discussed with the treatment team/care providers today including Rns and MD    Staff reports no behavioral or management issues.     The patient has been compliant with treatment.    Subjective 06/01/2022     Patient continues to report good mood, denies auditory or visual hallucination. Patient appears more focused and less tangential during assessment. When asked why she has not been attending groups, stated "I missed one or two, there's not really a reason." She agreed begin attending group again. Reports good sleep and appetite. May be candidate for discharge by end of week.     The patient denies any side effects to medications.    Psychiatric ROS (observed, reported, or endorsed/denied):  Depressed mood - No  Interest/pleasure/anhedonia: No  Guilt/hopelessness/worthlessness - No  Changes in Sleep - No  Changes in Appetite - No  Changes in Concentration - No  Changes in Energy - No  PMA/R- No  Suicidal- active/passive ideations - No  Homicidal ideations: active/passive ideations - No    Hallucinations - No  Delusions - less  Disorganized behavior - No  Disorganized speech - No  Negative symptoms - No    Elevated mood - less  Decreased need for sleep - No  Grandiosity - No  Racing thoughts - less  Impulsivity - No  Irritability- No  Increased energy - less  Distractibility - less  Increase in goal-directed activity or PMA- No    Symptoms of ARABELLA - No  Symptoms of Panic Disorder- No  Symptoms of PTSD - No    Overall " progress: Patient is showing moderate improvement    Psychotherapy:  Target symptoms: distractability, anxiety   Why chosen therapy is appropriate versus another modality: relevant to diagnosis, evidence based practice  Outcome monitoring methods: self-report, observation  Therapeutic intervention type: insight oriented psychotherapy, supportive psychotherapy  Topics discussed/themes: difficulty managing affect in interpersonal relationships, building skills sets for symptom management  The patient's response to the intervention is accepting. The patient's progress toward treatment goals is fair.   Duration of intervention: 16 minutes.    Medical ROS  Review of Systems   Constitutional: Negative.    HENT: Negative.    Eyes: Negative.    Respiratory: Negative.    Cardiovascular: Negative.    Gastrointestinal: Negative.    Genitourinary: Negative.    Musculoskeletal: Negative.    Skin: Negative.    Neurological: Negative.    Endo/Heme/Allergies: Negative.    Psychiatric/Behavioral:        As noted       PAST MEDICAL HISTORY   History reviewed. No pertinent past medical history.    PSYCHOTROPIC MEDICATIONS   Scheduled Meds:   EScitalopram oxalate  10 mg Oral Daily    folic acid  1 mg Oral Daily    multivitamin  1 tablet Oral Daily    risperiDONE  1 mg Oral BID     Continuous Infusions:  PRN Meds:.acetaminophen, aluminum-magnesium hydroxide-simethicone, docusate sodium, hydrOXYzine pamoate, hydrOXYzine pamoate, loperamide, OLANZapine **AND** OLANZapine    EXAMINATION    VITALS   Vitals:    05/30/22 1943 05/31/22 0805 05/31/22 1948 06/01/22 0757   BP: 105/62 114/64 113/61 (!) 103/57   BP Location:  Left arm Left arm Left arm   Patient Position:  Sitting Sitting Sitting   Pulse: 80 86 94 78   Resp: 16 18 16 20   Temp: 98.3 °F (36.8 °C) 98.1 °F (36.7 °C) 98.5 °F (36.9 °C) 97.5 °F (36.4 °C)   TempSrc:  Oral Oral Oral   SpO2: 98% 100% 98% 100%   Weight:       Height:           Body mass index is 23.34  "kg/m².    CONSTITUTIONAL  General Appearance: unremarkable, age appropriate    MUSCULOSKELETAL  Muscle Strength and Tone:no tremor, no tic  Abnormal Involuntary Movements: No  Gait and Station: non-ataxic    PSYCHIATRIC   Level of Consciousness: awake, alert , and oriented  Orientation: person, place, time, situation, and day of week  Grooming: Hospital garb and Well groomed  Psychomotor Behavior: normal, cooperative, friendly and cooperative  Speech: normal tone, normal rate, normal pitch, normal volume  Language: grossly intact  Mood: "Really good"  Affect: Consistent with mood and Congruent with thought  Thought Process: linear, logical  Associations: intact   Thought Content: DENIES suicidal ideation, DENIES homicidal ideation, and no delusions  Perceptions: denies hallucinations  Memory: Able to recall past events, Remote intact, and Recent intact  Attention:Attends to interview without distraction  Fund of Knowledge: Aware of current events, Intact, and Vocabulary appropriate   Estimate if Intelligence:  Average based on work/education history, vocabulary and mental status exam  Insight: has awareness of illness  Judgment: behavior is adequate to circumstances    DIAGNOSTIC TESTING   Laboratory Results  No results found for this or any previous visit (from the past 24 hour(s)).    MEDICAL DECISION MAKING   Psychosis  Anxiety  Amphetamine use  Psychosocial stressors        Psychosis:  -Patient counseled  -R/o substance induced  -Continue risperdal 0.5 mg, increase to 1 mg BID effective this evening 5/31/22---Continue risperdal 1 mg BID     Anxiety:  lexapro 5 mg po qam for anxiety---Increase to 10 mg daily tomorrow, 5/31/22----Increase to 10 mg daily---Continue lexapro 10 mg/day  Vistaril 25mg po q 8 hr prn anxiety     methamphetamine use  -Counseled on risks of substance use and tx options including inpt rehab, IOP, outpt therapy  -Patient not interested in rehab at this time           Pt was informed of all " the side effects, alternatives, risks and benefits of taking this medicine.  Pt made an informed decision to take these medications.  Pt was able to weigh the risks and benefits and stated that the benefits out weigh the risks at this time.      - Social Work will obtain follow up appointments for patient.      HEALTH SCREENING  Hemoglobin A1c  Lipid Panel     ENCOURAGE MAO MILIEU        NEED FOR CONTINUED HOSPITALIZATION  Psychiatric illness continues to pose a potential threat to life or bodily function, of self or others, thereby requiring the need for continued inpatient psychiatric hospitalization: Yes, due to: hallucinations and gravely disabled, as evidenced by:  Ongoing concerns with perceptual aberrancy and paranoid persecutory delusions leading to potential harm of self or others.     Protective inpatient pyschiatric hospitalization required while a safe disposition plan is enacted: Yes     Patient stabilized and ready for discharge from inpatient psychiatric unit: No     Target Disposition:  home     ESTIMATED 3-5 DAYS TO DISCHARGE  STAFF:   Kennedy Gandhi NP  Psychiatry

## 2022-06-01 NOTE — PLAN OF CARE
POC reviewed this shift and is on going. Patient is withdrawn, calm, cooperative, quiet, sleeping, and showing pressured speech. Denies Suicidal Ideation, Homicidal Ideation, Auditory Hallucinations, Visual Hallucinations, Tactile Hallucinations, Gustatory Hallucinations, and Delusions. Patient continues to isolate in her room. Patient doesn't cause any issues on the floor. Patient comes out for meals and snacks. SW was trying to call her Uncle to back her story. Pt continues to be medication compliant and staff will continue to monitor pt closely while on the unit.

## 2022-06-01 NOTE — PSYCH
Community Group  Time: 7917-0540 AM  Short Term Goal: Groups  Patient Response: Patient did not attend group. Will continue to monitor and encourage patient to attend all groups with fair participation.

## 2022-06-01 NOTE — PLAN OF CARE
SW attempted to call Kemar Rubalcava ([phone]), who pt states is her uncle, but it went straight to voicemail.  Will re-attempt call at later date.

## 2022-06-02 PROCEDURE — 90833 PSYTX W PT W E/M 30 MIN: CPT | Mod: SA,HB,, | Performed by: PSYCHIATRY & NEUROLOGY

## 2022-06-02 PROCEDURE — 99232 PR SUBSEQUENT HOSPITAL CARE,LEVL II: ICD-10-PCS | Mod: SA,HB,, | Performed by: PSYCHIATRY & NEUROLOGY

## 2022-06-02 PROCEDURE — 25000003 PHARM REV CODE 250: Performed by: INTERNAL MEDICINE

## 2022-06-02 PROCEDURE — 11400000 HC PSYCH PRIVATE ROOM

## 2022-06-02 PROCEDURE — 90833 PR PSYCHOTHERAPY W/PATIENT W/E&M, 30 MIN (ADD ON): ICD-10-PCS | Mod: SA,HB,, | Performed by: PSYCHIATRY & NEUROLOGY

## 2022-06-02 PROCEDURE — 99232 SBSQ HOSP IP/OBS MODERATE 35: CPT | Mod: SA,HB,, | Performed by: PSYCHIATRY & NEUROLOGY

## 2022-06-02 PROCEDURE — 25000003 PHARM REV CODE 250: Performed by: PSYCHIATRY & NEUROLOGY

## 2022-06-02 RX ADMIN — RISPERIDONE 1 MG: 1 TABLET ORAL at 08:06

## 2022-06-02 RX ADMIN — HYDROXYZINE PAMOATE 50 MG: 50 CAPSULE ORAL at 08:06

## 2022-06-02 RX ADMIN — ESCITALOPRAM OXALATE 10 MG: 10 TABLET ORAL at 08:06

## 2022-06-02 RX ADMIN — FOLIC ACID 1 MG: 1 TABLET ORAL at 08:06

## 2022-06-02 RX ADMIN — THERA TABS 1 TABLET: TAB at 08:06

## 2022-06-02 NOTE — PLAN OF CARE
"  Problem: Adult Behavioral Health Plan of Care  Goal: Optimized Coping Skills in Response to Life Stressors  Intervention: Promote Effective Coping Strategies  Flowsheets (Taken 6/2/2022 1106)  Supportive Measures:   active listening utilized   self-reflection promoted   self-responsibility promoted   verbalization of feelings encouraged   goal-setting facilitated    Behavior: Pt was alert, calm, cooperative and very engaged in group process. This was the first processing group she has attended since admission.      Intervention: In this CBT-focused process group LMSW facilitated a group discussion on preventing mental health relapse.  Each patient was asked to identify and discuss their warning signs for relapse and coping skills they can use when warning signs begin.        Response: Pt shared she was feeling "good" today because "I feel fortunate that God's coby allowed me this opportunity to be sober."  She shared that her early warning sign is that she has OCD like tendencies and over-does her hygiene, especially when she is out of her medication.  She shared she can stop hanging out with people who have alcohol, crystal meth, and cocaine, and can work on getting involved with new people and attending support groups.       Plan: Continue to encourage pt to participate in process groups to verbalize feelings and develop healthy coping skills.                   "

## 2022-06-02 NOTE — PROGRESS NOTES
"PSYCHIATRY DAILY INPATIENT PROGRESS NOTE  SUBSEQUENT HOSPITAL VISIT    ENCOUNTER DATE: 6/2/2022  SITE: [facility]    DATE OF ADMISSION: 5/27/2022  6:45 PM  LENGTH OF STAY: 6 days    CHIEF COMPLAINT   Kristina Vizcarra is a 36 y.o. female, seen during daily gonzalez rounds on the inpatient unit.  Kristina Vizcarra presented with the chief complaint of psychosis    The patient was seen and examined. The chart was reviewed.     Reviewed notes from Rns and MD and labs from the last 24 hours.    The patient's case was discussed with the treatment team/care providers today including Rns and MD    Staff reports no behavioral or management issues.     The patient has been compliant with treatment.    Subjective 06/02/2022     Patient continues to deny auditory and visual hallucinations, no delusional statements made during assessment. Denies side effect to current medication regimen. Of note, patient did attend and participate in group this morning, stating "it was a really great experience." Patient seems motivated and engage in aftercare process, requesting list of available AA meetings to maintain sobriety. Likely a candidate for discharge within 24 hours.     The patient denies any side effects to medications.    Psychiatric ROS (observed, reported, or endorsed/denied):  Depressed mood - No  Interest/pleasure/anhedonia: No  Guilt/hopelessness/worthlessness - No  Changes in Sleep - No  Changes in Appetite - No  Changes in Concentration - No  Changes in Energy - No  PMA/R- No  Suicidal- active/passive ideations - No  Homicidal ideations: active/passive ideations - No    Hallucinations - No  Delusions - less  Disorganized behavior - No  Disorganized speech - No  Negative symptoms - No    Elevated mood - less  Decreased need for sleep - No  Grandiosity - No  Racing thoughts - No  Impulsivity - No  Irritability- No  Increased energy - No  Distractibility - No  Increase in goal-directed activity or PMA- No    Symptoms of ARABELLA - " No  Symptoms of Panic Disorder- No  Symptoms of PTSD - No    Overall progress: Patient is showing moderate improvement    Psychotherapy:  · Target symptoms: alcohol abuse, psychosis  · Why chosen therapy is appropriate versus another modality: relevant to diagnosis, evidence based practice  · Outcome monitoring methods: self-report, observation  · Therapeutic intervention type: insight oriented psychotherapy, supportive psychotherapy  · Topics discussed/themes: building skills sets for symptom management, symptom recognition, substance abuse  · The patient's response to the intervention is accepting. The patient's progress toward treatment goals is good.   · Duration of intervention: 16 minutes.    Medical ROS  Review of Systems   Constitutional: Negative.    HENT: Negative.    Eyes: Negative.    Respiratory: Negative.    Cardiovascular: Negative.    Gastrointestinal: Negative.    Genitourinary: Negative.    Musculoskeletal: Negative.    Skin: Negative.    Neurological: Negative.    Endo/Heme/Allergies: Negative.    Psychiatric/Behavioral:        As noted       PAST MEDICAL HISTORY   History reviewed. No pertinent past medical history.    PSYCHOTROPIC MEDICATIONS   Scheduled Meds:   EScitalopram oxalate  10 mg Oral Daily    folic acid  1 mg Oral Daily    multivitamin  1 tablet Oral Daily    risperiDONE  1 mg Oral BID     Continuous Infusions:  PRN Meds:.acetaminophen, aluminum-magnesium hydroxide-simethicone, docusate sodium, hydrOXYzine pamoate, hydrOXYzine pamoate, loperamide, OLANZapine **AND** OLANZapine    EXAMINATION    VITALS   Vitals:    05/31/22 1948 06/01/22 0757 06/01/22 1905 06/02/22 0733   BP: 113/61 (!) 103/57 (!) 101/59 (!) 103/58   BP Location: Left arm Left arm Left arm Left arm   Patient Position: Sitting Sitting Sitting Sitting   Pulse: 94 78 83 65   Resp: 16 20 16 20   Temp: 98.5 °F (36.9 °C) 97.5 °F (36.4 °C) 97.8 °F (36.6 °C) 98.2 °F (36.8 °C)   TempSrc: Oral Oral Oral Oral   SpO2: 98% 100%  99% 99%   Weight:       Height:           Body mass index is 23.34 kg/m².    CONSTITUTIONAL  General Appearance: unremarkable, age appropriate    MUSCULOSKELETAL  Muscle Strength and Tone:no tremor, no tic  Abnormal Involuntary Movements: No  Gait and Station: non-ataxic    PSYCHIATRIC   Level of Consciousness: awake, alert  and oriented  Orientation: person, place, time, situation and day of week  Grooming: Disheveled and Hospital garb  Psychomotor Behavior: normal, cooperative, friendly and cooperative  Speech: normal tone, normal rate, normal pitch, normal volume  Language: grossly intact  Mood: great  Affect: Consistent with mood and Congruent with thought  Thought Process: linear, logical  Associations: intact   Thought Content: DENIES suicidal ideation, DENIES homicidal ideation and no delusions  Perceptions: denies hallucinations  Memory: Able to recall past events, Remote intact and Recent intact  Attention:Normal and Attends to interview without distraction  Fund of Knowledge: Aware of current events, Intact and Vocabulary appropriate   Estimate if Intelligence:  Average based on work/education history, vocabulary and mental status exam  Insight: has awareness of illness  Judgment: behavior is adequate to circumstances    DIAGNOSTIC TESTING   Laboratory Results  No results found for this or any previous visit (from the past 24 hour(s)).    MEDICAL DECISION MAKING     Psychosis  Anxiety  Amphetamine use    Psychosocial stressors        Psychosis:  -Patient counseled  -R/o substance induced  -Continue risperdal 0.5 mg, increase to 1 mg BID effective this evening Continue risperdal 1 mg BID     Anxiety:  lexapro 5 mg po qam for anxiety---Increase to 10 mg daily tomorrow, 5/31/22----Increase to 10 mg daily---Continue lexapro 10 mg/day  Vistaril 25mg po q 8 hr prn anxiety     methamphetamine use  -Counseled on risks of substance use and tx options including inpt rehab, IOP, outpt therapy  -Patient not interested  in rehab at this time           Pt was informed of all the side effects, alternatives, risks and benefits of taking this medicine.  Pt made an informed decision to take these medications.  Pt was able to weigh the risks and benefits and stated that the benefits out weigh the risks at this time.      - Social Work will obtain follow up appointments for patient.      HEALTH SCREENING  Hemoglobin A1c  Lipid Panel     ENCOURAGE MAO MILIEU        NEED FOR CONTINUED HOSPITALIZATION  Psychiatric illness continues to pose a potential threat to life or bodily function, of self or others, thereby requiring the need for continued inpatient psychiatric hospitalization: Yes, due to: hallucinations and gravely disabled, as evidenced by:  Ongoing concerns with perceptual aberrancy and paranoid persecutory delusions leading to potential harm of self or others.     Protective inpatient pyschiatric hospitalization required while a safe disposition plan is enacted: Yes     Patient stabilized and ready for discharge from inpatient psychiatric unit: No     Target Disposition:  home      STAFF:   Kennedy Gandhi NP  Psychiatry

## 2022-06-02 NOTE — PSYCH
Community Group  Time:4695-9131 AM  Short Term Goal: Self Image  Patient Response: Improve self image by putting self first.

## 2022-06-02 NOTE — PLAN OF CARE
POC reviewed this shift and is on going. Patient is withdrawn, calm, cooperative, quiet, and sleeping. Denies Suicidal Ideation, Homicidal Ideation, Auditory Hallucinations, Visual Hallucinations, Tactile Hallucinations, Gustatory Hallucinations, and Delusions. Patient continues to isolate in her room. Patient did mention she attended two group sessions today. Patient on schedule to margi discharge tomorrow.  Pt continues to be medication compliant and staff will continue to monitor pt closely while on the unit.

## 2022-06-02 NOTE — PLAN OF CARE
POC reviewed this shift and is on going.  Pt pressured speech and hyper verbal. Interacting with peers in dayroom. Took meds with no adverse reaction. Able to make needs known. Pt cooperative with staff. Pt states she is doing well and sleeping good. Denies SI/HI, A/V hallucinations. Will continue to monitor for changes and safety.

## 2022-06-03 VITALS
SYSTOLIC BLOOD PRESSURE: 105 MMHG | TEMPERATURE: 98 F | RESPIRATION RATE: 22 BRPM | BODY MASS INDEX: 23.22 KG/M2 | OXYGEN SATURATION: 100 % | WEIGHT: 136 LBS | HEIGHT: 64 IN | DIASTOLIC BLOOD PRESSURE: 66 MMHG | HEART RATE: 76 BPM

## 2022-06-03 PROCEDURE — 99239 HOSP IP/OBS DSCHRG MGMT >30: CPT | Mod: AF,HB,, | Performed by: STUDENT IN AN ORGANIZED HEALTH CARE EDUCATION/TRAINING PROGRAM

## 2022-06-03 PROCEDURE — S4991 NICOTINE PATCH NONLEGEND: HCPCS | Performed by: PSYCHIATRY & NEUROLOGY

## 2022-06-03 PROCEDURE — 25000003 PHARM REV CODE 250: Performed by: PSYCHIATRY & NEUROLOGY

## 2022-06-03 PROCEDURE — 25000003 PHARM REV CODE 250: Performed by: INTERNAL MEDICINE

## 2022-06-03 PROCEDURE — 99239 PR HOSPITAL DISCHARGE DAY,>30 MIN: ICD-10-PCS | Mod: AF,HB,, | Performed by: STUDENT IN AN ORGANIZED HEALTH CARE EDUCATION/TRAINING PROGRAM

## 2022-06-03 PROCEDURE — 90833 PR PSYCHOTHERAPY W/PATIENT W/E&M, 30 MIN (ADD ON): ICD-10-PCS | Mod: AF,HB,, | Performed by: STUDENT IN AN ORGANIZED HEALTH CARE EDUCATION/TRAINING PROGRAM

## 2022-06-03 PROCEDURE — 90833 PSYTX W PT W E/M 30 MIN: CPT | Mod: AF,HB,, | Performed by: STUDENT IN AN ORGANIZED HEALTH CARE EDUCATION/TRAINING PROGRAM

## 2022-06-03 RX ORDER — IBUPROFEN 200 MG
1 TABLET ORAL DAILY
Qty: 28 PATCH | Refills: 0 | Status: ON HOLD | OUTPATIENT
Start: 2022-06-04 | End: 2023-12-08

## 2022-06-03 RX ORDER — ESCITALOPRAM OXALATE 10 MG/1
10 TABLET ORAL DAILY
Qty: 30 TABLET | Refills: 0 | Status: ON HOLD | OUTPATIENT
Start: 2022-06-04 | End: 2023-12-10 | Stop reason: HOSPADM

## 2022-06-03 RX ORDER — IBUPROFEN 200 MG
1 TABLET ORAL DAILY
Status: DISCONTINUED | OUTPATIENT
Start: 2022-06-03 | End: 2022-06-03 | Stop reason: HOSPADM

## 2022-06-03 RX ORDER — RISPERIDONE 1 MG/1
1 TABLET ORAL 2 TIMES DAILY
Qty: 60 TABLET | Refills: 0 | Status: ON HOLD | OUTPATIENT
Start: 2022-06-03 | End: 2023-12-10 | Stop reason: HOSPADM

## 2022-06-03 RX ADMIN — THERA TABS 1 TABLET: TAB at 08:06

## 2022-06-03 RX ADMIN — FOLIC ACID 1 MG: 1 TABLET ORAL at 08:06

## 2022-06-03 RX ADMIN — ESCITALOPRAM OXALATE 10 MG: 10 TABLET ORAL at 08:06

## 2022-06-03 RX ADMIN — RISPERIDONE 1 MG: 1 TABLET ORAL at 08:06

## 2022-06-03 RX ADMIN — NICOTINE 1 PATCH: 21 PATCH, EXTENDED RELEASE TRANSDERMAL at 09:06

## 2022-06-03 NOTE — DISCHARGE INSTRUCTIONS
Discharge instructions and AVS reviewed with patient. Patient verbalized understanding. Education information provided.

## 2022-06-03 NOTE — PLAN OF CARE
Patient meets criteria for discharge.  visited with order for discharge today. Denies S/HI, A/VH, No delusions present. No negative behaviors. Dr. Haji visited per telemed with order for discharge home today. Patient refuses inpatient rehab prefers out patient for substance abuse and agrees to follow up with mental  appointments and taking prescribed medications. Discharge instructions and AVS reviewed with patient with pharmacy,medications, and follow up mental health appointment information given.. Patient verbalized understanding. Discharge paperwork given to patient. Patient left unit ambulatory with personal belongings accompanied by staff member downstairs to meet family. Patient left no distress noted.
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
## 2022-06-03 NOTE — PLAN OF CARE
POC reviewed this shift and is ongoing.  Pt is cooperative with staff and interacting with peers while waiting for meds. Pt has been isolating in her room this shift. Staff admin vistaril prn for insomnia. Pt sleep with ss of distress. Denies SI/HI, A/V hallucination. Will continue to monitor for changes and safety.

## 2022-06-03 NOTE — PLAN OF CARE
"  Problem: Decreased Participation and Engagement (Excessive Substance Use)  Goal: Increased Participation and Engagement (Excessive Substance Use)  Flowsheets (Taken 6/3/2022 1315)  Mutually Determined Action Steps (Increased Participation and Engagement):   discusses ongoing recovery plan   identifies support resources    Behavior: Pt was alert, cooperative, and actively engaged in group process, sharing several times.       Intervention: In this CBT-focused group LMSW facilitated group discussion on coping skills. Each patient was asked to identify and discuss healthy coping skills to help them meet their treatment goals.        Response: Pt shared that she was feeling "great" today because she is going home.  She shared that she needs to put God first, then herself, and take her medication as prescribed, and can also using coping skills or organizing and learning make-up hacks.       Plan: Continue to encourage pt to participate in process groups to verbalize feelings and develop healthy coping skills.                   "

## 2022-06-03 NOTE — PROGRESS NOTES
Psychotherapy:  · Target symptoms: substance abuse, psychosis  · Why chosen therapy is appropriate versus another modality: relevant to diagnosis  · Outcome monitoring methods: self-report  · Therapeutic intervention type: supportive psychotherapy  · Topics discussed/themes: educational, motivational, safety plan, relationships difficulties, building skills sets for symptom management, symptom recognition, substance abuse  · The patient's response to the intervention is accepting. The patient's progress toward treatment goals is fair.   · Duration of intervention: 16 minutes.

## 2023-12-07 ENCOUNTER — HOSPITAL ENCOUNTER (INPATIENT)
Facility: HOSPITAL | Age: 38
LOS: 3 days | Discharge: HOME | DRG: 897 | End: 2023-12-10
Attending: STUDENT IN AN ORGANIZED HEALTH CARE EDUCATION/TRAINING PROGRAM | Admitting: PSYCHIATRY & NEUROLOGY
Payer: MEDICAID

## 2023-12-07 DIAGNOSIS — F19.959 SUBSTANCE-INDUCED PSYCHOTIC DISORDER: Primary | ICD-10-CM

## 2023-12-07 LAB
ALBUMIN SERPL BCP-MCNC: 3.5 G/DL (ref 3.5–5.2)
ALP SERPL-CCNC: 50 U/L (ref 55–135)
ALT SERPL W/O P-5'-P-CCNC: 16 U/L (ref 10–44)
AMPHET+METHAMPHET UR QL: ABNORMAL
ANION GAP SERPL CALC-SCNC: 3 MMOL/L (ref 3–11)
APAP SERPL-MCNC: <2 UG/ML (ref 10–20)
AST SERPL-CCNC: 7 U/L (ref 10–40)
B-HCG UR QL: NEGATIVE
BACTERIA #/AREA URNS HPF: NEGATIVE /HPF
BARBITURATES UR QL SCN>200 NG/ML: NEGATIVE
BASOPHILS # BLD AUTO: 0.06 K/UL (ref 0–0.2)
BASOPHILS NFR BLD: 0.9 % (ref 0–1.9)
BENZODIAZ UR QL SCN>200 NG/ML: NEGATIVE
BILIRUB SERPL-MCNC: <0.1 MG/DL (ref 0.1–1)
BILIRUB UR QL STRIP: NEGATIVE
BUN SERPL-MCNC: 13 MG/DL (ref 6–20)
BZE UR QL SCN: NEGATIVE
CALCIUM SERPL-MCNC: 8.4 MG/DL (ref 8.7–10.5)
CANNABINOIDS UR QL SCN: NEGATIVE
CHLORIDE SERPL-SCNC: 110 MMOL/L (ref 95–110)
CLARITY UR: ABNORMAL
CO2 SERPL-SCNC: 26 MMOL/L (ref 23–29)
COLOR UR: YELLOW
CREAT SERPL-MCNC: 0.9 MG/DL (ref 0.5–1.4)
CREAT UR-MCNC: 231 MG/DL (ref 15–325)
DIFFERENTIAL METHOD: NORMAL
EOSINOPHIL # BLD AUTO: 0.1 K/UL (ref 0–0.5)
EOSINOPHIL NFR BLD: 1.6 % (ref 0–8)
ERYTHROCYTE [DISTWIDTH] IN BLOOD BY AUTOMATED COUNT: 13.1 % (ref 11.5–14.5)
EST. GFR  (NO RACE VARIABLE): >60 ML/MIN/1.73 M^2
ETHANOL SERPL-MCNC: <3 MG/DL
GLUCOSE SERPL-MCNC: 121 MG/DL (ref 70–110)
GLUCOSE UR QL STRIP: NEGATIVE
HCT VFR BLD AUTO: 37.2 % (ref 37–48.5)
HGB BLD-MCNC: 12.6 G/DL (ref 12–16)
HGB UR QL STRIP: ABNORMAL
HYALINE CASTS #/AREA URNS LPF: 4.7 /LPF
IMM GRANULOCYTES # BLD AUTO: 0.01 K/UL (ref 0–0.04)
IMM GRANULOCYTES NFR BLD AUTO: 0.2 % (ref 0–0.5)
KETONES UR QL STRIP: ABNORMAL
LEUKOCYTE ESTERASE UR QL STRIP: NEGATIVE
LYMPHOCYTES # BLD AUTO: 2.1 K/UL (ref 1–4.8)
LYMPHOCYTES NFR BLD: 33.1 % (ref 18–48)
MCH RBC QN AUTO: 29.6 PG (ref 27–31)
MCHC RBC AUTO-ENTMCNC: 33.9 G/DL (ref 32–36)
MCV RBC AUTO: 88 FL (ref 82–98)
METHADONE UR QL SCN>300 NG/ML: NEGATIVE
MICROSCOPIC COMMENT: ABNORMAL
MONOCYTES # BLD AUTO: 0.3 K/UL (ref 0.3–1)
MONOCYTES NFR BLD: 5.3 % (ref 4–15)
NEUTROPHILS # BLD AUTO: 3.8 K/UL (ref 1.8–7.7)
NEUTROPHILS NFR BLD: 58.9 % (ref 38–73)
NITRITE UR QL STRIP: NEGATIVE
NRBC BLD-RTO: 0 /100 WBC
OPIATES UR QL SCN: NEGATIVE
PCP UR QL SCN>25 NG/ML: NEGATIVE
PH UR STRIP: 6 [PH] (ref 5–8)
PLATELET # BLD AUTO: 308 K/UL (ref 150–450)
PMV BLD AUTO: 10 FL (ref 9.2–12.9)
POTASSIUM SERPL-SCNC: 4.1 MMOL/L (ref 3.5–5.1)
PROT SERPL-MCNC: 6.9 G/DL (ref 6–8.4)
PROT UR QL STRIP: ABNORMAL
RBC # BLD AUTO: 4.25 M/UL (ref 4–5.4)
RBC #/AREA URNS HPF: 5 /HPF (ref 0–4)
SODIUM SERPL-SCNC: 139 MMOL/L (ref 136–145)
SP GR UR STRIP: 1.02 (ref 1–1.03)
SQUAMOUS #/AREA URNS HPF: 11 /HPF
TOXICOLOGY INFORMATION: ABNORMAL
TSH SERPL DL<=0.005 MIU/L-ACNC: 2.19 UIU/ML (ref 0.4–4)
URN SPEC COLLECT METH UR: ABNORMAL
UROBILINOGEN UR STRIP-ACNC: NEGATIVE EU/DL
WBC # BLD AUTO: 6.37 K/UL (ref 3.9–12.7)
WBC #/AREA URNS HPF: 9 /HPF (ref 0–5)

## 2023-12-07 PROCEDURE — 93005 ELECTROCARDIOGRAM TRACING: CPT

## 2023-12-07 PROCEDURE — 81000 URINALYSIS NONAUTO W/SCOPE: CPT | Mod: 59 | Performed by: EMERGENCY MEDICINE

## 2023-12-07 PROCEDURE — 80061 LIPID PANEL: CPT | Performed by: INTERNAL MEDICINE

## 2023-12-07 PROCEDURE — 82077 ASSAY SPEC XCP UR&BREATH IA: CPT | Performed by: EMERGENCY MEDICINE

## 2023-12-07 PROCEDURE — 84443 ASSAY THYROID STIM HORMONE: CPT | Performed by: EMERGENCY MEDICINE

## 2023-12-07 PROCEDURE — 83036 HEMOGLOBIN GLYCOSYLATED A1C: CPT | Performed by: INTERNAL MEDICINE

## 2023-12-07 PROCEDURE — 80307 DRUG TEST PRSMV CHEM ANLYZR: CPT | Performed by: EMERGENCY MEDICINE

## 2023-12-07 PROCEDURE — 36415 COLL VENOUS BLD VENIPUNCTURE: CPT | Performed by: EMERGENCY MEDICINE

## 2023-12-07 PROCEDURE — 80053 COMPREHEN METABOLIC PANEL: CPT | Performed by: EMERGENCY MEDICINE

## 2023-12-07 PROCEDURE — 81025 URINE PREGNANCY TEST: CPT | Performed by: STUDENT IN AN ORGANIZED HEALTH CARE EDUCATION/TRAINING PROGRAM

## 2023-12-07 PROCEDURE — 93010 ELECTROCARDIOGRAM REPORT: CPT | Mod: ,,, | Performed by: INTERNAL MEDICINE

## 2023-12-07 PROCEDURE — 80143 DRUG ASSAY ACETAMINOPHEN: CPT | Performed by: EMERGENCY MEDICINE

## 2023-12-07 PROCEDURE — 85025 COMPLETE CBC W/AUTO DIFF WBC: CPT | Performed by: EMERGENCY MEDICINE

## 2023-12-07 PROCEDURE — 99285 EMERGENCY DEPT VISIT HI MDM: CPT

## 2023-12-07 PROCEDURE — 81003 URINALYSIS AUTO W/O SCOPE: CPT | Mod: 59 | Performed by: EMERGENCY MEDICINE

## 2023-12-07 PROCEDURE — 93010 EKG 12-LEAD: ICD-10-PCS | Mod: ,,, | Performed by: INTERNAL MEDICINE

## 2023-12-07 PROCEDURE — 11400000 HC PSYCH PRIVATE ROOM

## 2023-12-07 RX ORDER — TALC
6 POWDER (GRAM) TOPICAL NIGHTLY PRN
Status: DISCONTINUED | OUTPATIENT
Start: 2023-12-07 | End: 2023-12-08

## 2023-12-07 RX ORDER — SERTRALINE HYDROCHLORIDE 25 MG/1
25 TABLET, FILM COATED ORAL DAILY
COMMUNITY

## 2023-12-07 RX ORDER — SODIUM CHLORIDE 0.9 % (FLUSH) 0.9 %
10 SYRINGE (ML) INJECTION
Status: DISCONTINUED | OUTPATIENT
Start: 2023-12-07 | End: 2023-12-08

## 2023-12-07 NOTE — Clinical Note
Diagnosis: Substance-induced psychotic disorder [4602337]   Future Attending Provider: GEORGIE RODRÍGUEZ [[id number]]   Admitting Provider:: GEORGIE RODRÍGUEZ [[id number]]

## 2023-12-08 PROBLEM — F19.959 SUBSTANCE-INDUCED PSYCHOTIC DISORDER: Status: ACTIVE | Noted: 2023-12-08

## 2023-12-08 PROCEDURE — 90833 PSYTX W PT W E/M 30 MIN: CPT | Mod: AF,HB,, | Performed by: PSYCHIATRY & NEUROLOGY

## 2023-12-08 PROCEDURE — 90833 PR PSYCHOTHERAPY W/PATIENT W/E&M, 30 MIN (ADD ON): ICD-10-PCS | Mod: AF,HB,, | Performed by: PSYCHIATRY & NEUROLOGY

## 2023-12-08 PROCEDURE — 25000003 PHARM REV CODE 250: Performed by: PSYCHIATRY & NEUROLOGY

## 2023-12-08 PROCEDURE — 99222 1ST HOSP IP/OBS MODERATE 55: CPT | Mod: AF,HB,, | Performed by: PSYCHIATRY & NEUROLOGY

## 2023-12-08 PROCEDURE — 99222 PR INITIAL HOSPITAL CARE,LEVL II: ICD-10-PCS | Mod: AF,HB,, | Performed by: PSYCHIATRY & NEUROLOGY

## 2023-12-08 PROCEDURE — 11400000 HC PSYCH PRIVATE ROOM

## 2023-12-08 RX ORDER — OLANZAPINE 10 MG/2ML
10 INJECTION, POWDER, FOR SOLUTION INTRAMUSCULAR EVERY 8 HOURS PRN
Status: DISCONTINUED | OUTPATIENT
Start: 2023-12-08 | End: 2023-12-10 | Stop reason: HOSPADM

## 2023-12-08 RX ORDER — SERTRALINE HYDROCHLORIDE 25 MG/1
25 TABLET, FILM COATED ORAL DAILY
Status: DISCONTINUED | OUTPATIENT
Start: 2023-12-08 | End: 2023-12-10 | Stop reason: HOSPADM

## 2023-12-08 RX ORDER — PROMETHAZINE HYDROCHLORIDE 25 MG/1
25 TABLET ORAL EVERY 6 HOURS PRN
Status: DISCONTINUED | OUTPATIENT
Start: 2023-12-08 | End: 2023-12-10 | Stop reason: HOSPADM

## 2023-12-08 RX ORDER — ACETAMINOPHEN 325 MG/1
650 TABLET ORAL EVERY 6 HOURS PRN
Status: DISCONTINUED | OUTPATIENT
Start: 2023-12-08 | End: 2023-12-10 | Stop reason: HOSPADM

## 2023-12-08 RX ORDER — BENZONATATE 100 MG/1
100 CAPSULE ORAL 3 TIMES DAILY PRN
Status: DISCONTINUED | OUTPATIENT
Start: 2023-12-08 | End: 2023-12-10 | Stop reason: HOSPADM

## 2023-12-08 RX ORDER — BENZTROPINE MESYLATE 1 MG/ML
2 INJECTION, SOLUTION INTRAMUSCULAR; INTRAVENOUS EVERY 8 HOURS PRN
Status: DISCONTINUED | OUTPATIENT
Start: 2023-12-08 | End: 2023-12-10 | Stop reason: HOSPADM

## 2023-12-08 RX ORDER — MAG HYDROX/ALUMINUM HYD/SIMETH 200-200-20
30 SUSPENSION, ORAL (FINAL DOSE FORM) ORAL EVERY 6 HOURS PRN
Status: DISCONTINUED | OUTPATIENT
Start: 2023-12-08 | End: 2023-12-10 | Stop reason: HOSPADM

## 2023-12-08 RX ORDER — IBUPROFEN 200 MG
1 TABLET ORAL DAILY PRN
Status: DISCONTINUED | OUTPATIENT
Start: 2023-12-08 | End: 2023-12-10 | Stop reason: HOSPADM

## 2023-12-08 RX ORDER — ONDANSETRON 4 MG/1
4 TABLET, ORALLY DISINTEGRATING ORAL EVERY 8 HOURS PRN
Status: DISCONTINUED | OUTPATIENT
Start: 2023-12-08 | End: 2023-12-10 | Stop reason: HOSPADM

## 2023-12-08 RX ORDER — OLANZAPINE 10 MG/1
10 TABLET ORAL EVERY 8 HOURS PRN
Status: DISCONTINUED | OUTPATIENT
Start: 2023-12-08 | End: 2023-12-10 | Stop reason: HOSPADM

## 2023-12-08 RX ORDER — HYDROXYZINE PAMOATE 50 MG/1
50 CAPSULE ORAL EVERY 6 HOURS PRN
Status: DISCONTINUED | OUTPATIENT
Start: 2023-12-08 | End: 2023-12-10 | Stop reason: HOSPADM

## 2023-12-08 RX ORDER — LOPERAMIDE HYDROCHLORIDE 2 MG/1
2 CAPSULE ORAL
Status: DISCONTINUED | OUTPATIENT
Start: 2023-12-08 | End: 2023-12-10 | Stop reason: HOSPADM

## 2023-12-08 RX ADMIN — SERTRALINE HYDROCHLORIDE 25 MG: 25 TABLET ORAL at 10:12

## 2023-12-08 NOTE — PLAN OF CARE
CSW attempted to make contact with pt's sister Cate Pinzon regarding collateral and discharge plan but was unable to reach. CSW will re attempt at later date. [phone]

## 2023-12-08 NOTE — ED PROVIDER NOTES
"[facility]                                                  Chief Complaint   Patient presents with    Mental Health Problem     Pt brought in by [facility] office with OPC by mother. States that she is using meth, delusional, and violent. Pt denies drug use. Pt brought in by [facility] Celso Chappell [facility]         History of Present Illness  38 y.o. female who was OPC'd by sofia due to paranoid, delusional behavior x 3 days. She reports recent methamphetamine use. Denies SI, HI, hallucinations.    Review of patient's allergies indicates:  No Known Allergies  Past Medical History:   Diagnosis Date    Generalized anxiety disorder      History reviewed. No pertinent surgical history.   Family History   Problem Relation Age of Onset    Anxiety disorder Mother      Social History     Tobacco Use    Smoking status: Every Day     Types: Vaping with nicotine    Smokeless tobacco: Never   Substance Use Topics    Alcohol use: Yes     Alcohol/week: 2.0 standard drinks of alcohol     Types: 2 Cans of beer per week    Drug use: Yes     Types: Cocaine, Methamphetamines          Review of Systems and Physical Exam     Review of Systems    Pertinent positives and negatives listed in HPI    Vital Signs   height is 5' 4" (1.626 m) and weight is 54.9 kg (121 lb). Her temperature is 98.2 °F (36.8 °C). Her blood pressure is 121/79 and her pulse is 88. Her respiration is 18 and oxygen saturation is 100%.      Physical Exam  Nursing note and vitals reviewed  --Constitutional: In no acute distress.  --HENT: Normocephalic, atraumatic.  --Eyes: Normal conjunctiva.  --Neck: Normal range of motion.  --Chest/Cardiac: Normal rate, intact distal pulses.  --Pulmonary: Normal respiratory effort, breath sounds normal.  --Abdominal: Soft, with no tenderness.  --Musculoskeletal: Normal range of motion. No deformity.  --Neurological: Alert and oriented x 4. Normal tone.  --Skin: Warm and dry.  --Psych: Normal mood.    Results " (Studies ordered and reviewed by me)     EKG (interpreted by me)  Rhythm: Normal sinus rhythm  Axis: Normal  ST-segments: No elevation or depression  Overall Interpretation: Normal sinus rhythm with no signs of ischemia or infarction    Labs:    Labs Reviewed   URINALYSIS, REFLEX TO URINE CULTURE - Abnormal; Notable for the following components:       Result Value    Appearance, UA Cloudy (*)     Protein, UA 1+ (*)     Ketones, UA Trace (*)     Occult Blood UA 3+ (*)     All other components within normal limits    Narrative:     Preferred Collection Type->Urine, Clean Catch  Specimen Source->Urine   ACETAMINOPHEN LEVEL - Abnormal; Notable for the following components:    Acetaminophen (Tylenol), Serum <2.0 (*)     All other components within normal limits   COMPREHENSIVE METABOLIC PANEL - Abnormal; Notable for the following components:    Glucose 121 (*)     Calcium 8.4 (*)     Total Bilirubin <0.1 (*)     Alkaline Phosphatase 50 (*)     AST 7 (*)     All other components within normal limits   DRUG SCREEN PANEL, URINE EMERGENCY - Abnormal; Notable for the following components:    Amphetamine Screen, Ur Presumptive Positive (*)     All other components within normal limits    Narrative:     Preferred Collection Type->Urine, Clean Catch  Specimen Source->Urine   URINALYSIS MICROSCOPIC - Abnormal; Notable for the following components:    RBC, UA 5 (*)     WBC, UA 9 (*)     Hyaline Casts, UA 4.7 (*)     All other components within normal limits    Narrative:     Preferred Collection Type->Urine, Clean Catch  Specimen Source->Urine   ALCOHOL,MEDICAL (ETHANOL)   TSH   CBC W/ AUTO DIFFERENTIAL   PREGNANCY TEST, URINE RAPID    Narrative:     Specimen Source->Urine       Radiology:    No orders to display      Procedures     No procedures performed    Medical Decision Making and ED Course     History Source(s):  Patient, Mother    Social Determinants of Health:  This visit with significantly impacted by assessment of  "access to primary care.     height is 5' 4" (1.626 m) and weight is 54.9 kg (121 lb). Her temperature is 98.2 °F (36.8 °C). Her blood pressure is 121/79 and her pulse is 88. Her respiration is 18 and oxygen saturation is 100%.     MDM: 38 y.o. female who was OPC'd by sofia due to paranoid, delusional behavior x 3 days. Physical exam findings noted above. Initial ddx include but is not limited to: Anxiety, Depression, Bipolar disorder, Schizophrenia,  Acute psychosis, Drug Intoxication.    Lab/Study Interpretation: Normal WBC/normal H-H/normal platelets on CBC, low LFTs on CMP, ketonuria/proteinuria/hematuria on UA, positive amphetamines on UDS, normal Tylenol Level, normal Ethanol Level, negative UPT.    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 Patient was PEC'd and medically cleared for transfer to an inpatient psychiatric unit.    Diagnosis  The encounter diagnosis was Substance-induced psychotic disorder.    Disposition and Plan  Condition: Stable  Disposition: Transfer      Billing Matrix     Number and Complexity of Problems Addressed  1. Substance-induced psychotic disorder        Moderate  []  1 or more chronic illnesses with exacerbation, progression or side effects of treatment   []  2 or more stable chronic illnesses        []  1 undiagnosed new problem with uncertain prognosis        []  1 acute illness with systemic symptoms         []  1 acute complicated injury             High  []  1 or more chronic illnesses with severe exacerbation, progression, or side effects of treatment  [x]  1 acute or chronic illness or injury that poses a threat to life or bodily function    Amount and/or Complexity of Data to be Reviewed and Analyzed    Moderate (must meet the requirements of at least 1 out of 3 categories)  Extensive (must meet the requirements of at least 2 out of 3 categories)    Category 1: Tests, documents or independent historian(s)  Any combination of 3 from the following: [] " See ED Course  []  []  [] Review of prior external note(s) from each unique source    [x]  [x]  [x] Review of the result(s) of each unique test                              [x]  [x]  [x] Ordering of each unique test                                                       [x]  []  []  Assessment requiring an independent historian(s)           Category 2: Independent interpretation of tests  [] See ED Course  []  Independent interpretation of a test performed by another physician/other qualified health care professional      Category 3: Discussion of management or test interpretation  [] See ED Course  []  Discussion of management or test interpretation with external physician/other qualified health care professional/appropriate source    Risk of Complications and/or Morbidity or Mortality of Patient Management    Moderate risk of morbidity from additional diagnostic testing or treatment  []  Prescription drug management  []  Decision regarding minor surgery with identified patient or procedure risk factors  [x]  Diagnosis or treatment significantly limited by social determinants of health    High risk of morbidity from additional diagnostic testing or treatment  []  Drug therapy requiring intensive monitoring for toxicity  []  Decision regarding emergent major surgery  [x]  Decision regarding hospitalization or escalation of hospital-level of care  []  Decision not to resuscitate or to de-escalate care because of poor prognosis  []  Parenteral controlled substances       Luis M Mishra MD  12/07/23 5058
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
## 2023-12-08 NOTE — PROGRESS NOTES
[facility])  Adult Nutrition  Consult Note    SUMMARY     RD consulted for new admit. Spoke with RN. RN stated that patient has no dietary issues at this time. RD to sign off. Please reconsult if any dietary/nutrition issues arise.

## 2023-12-08 NOTE — NURSING
37 YO F admitted from [facility].  Pt arrived there via [facility] after being OPCed by her mother.  Per ED notes, OPC alleges that pt is delusional, abusing meth and violent.  UDS + for amphetamines.      Pt is calm and cooperative upon arrival. Pt denies SI/HI/AVH.  Pt reports recent methamphetamine use, which she describes as 'dabbling'.  Pt denies that her drug use is a problem and reports that she believes her mother maliciously OPCed her because she hasn't spoken to her mother in 5 days.  Pt states that she sees someone a [facility], is prescribed an antidepressant, is compliant with treatment and feels that her medication is appropriate and is working.  Pt does report some anxiety.      [employer]scan performed by security negative for contraband.  Pt very cooperative during admission process.  Pt belongings inventoried and secured per tech.  Pt oriented to rules and procedures of unit per nurse and tech.  Pt given snack, consumed 100%.  Pt signed all necessary forms. And was shown to room per tech.

## 2023-12-08 NOTE — PLAN OF CARE
12/08/23 1653   Step 1: Warning Signs   Warning Sign 1 relationship with mother   Warning Sign 2 drama/negativity being created   Warning Sign 3 relationship with son being jeopardize by my mother   Step 2: Internal coping strategies - Things I can do to take my mind off my problems without contacting another person:   Coping Strategy 1 praying   Coping Strategy 2 reading the bible   Coping Strategy 3 listening to music   Step 3: People and social settings that provide distraction:   1. Name Cesar Watkins (brother)       Phone [phone]   2. Name Cate Watkins (sister)       Phone [phone]   3. Place [facility]   4. Place siblings' home    Step 4: People whom I can ask for help:   1. Person Cesar Watkins       Phone [phone]   2. Person Cate Watkins       Phone [phone]   3. Person Rosalba Vizcarra (sister)   Step 5: Professionals or agencies I can contact during a crisis:   1. Clinician Name [facility]       Phone [phone]   3. Suicide Prevention Lifeline: 988 Suicide Prevention Lifeline 988   4. [facility]       911       Emergency Services Phone Warm Line [phone] 5pm-10pm wed-sun   Step 6: Making the environment safer (plan for lethal means safety)   Safe Environment Plan stay away from mother, get a restraining order against mother   Safe Environment Optional: What is most important to me and worth living for? my sonGarcia   Safety Plan Tasks   Provided a Hard Copy to the Patient Y   Explained How to Follow the Steps Y   Discussed Facilitators and Barriers Y

## 2023-12-08 NOTE — SUBJECTIVE & OBJECTIVE
Past Medical History:   Diagnosis Date    Generalized anxiety disorder        History reviewed. No pertinent surgical history.    Review of patient's allergies indicates:  No Known Allergies    No current facility-administered medications on file prior to encounter.     Current Outpatient Medications on File Prior to Encounter   Medication Sig    sertraline (ZOLOFT) 25 MG tablet Take 25 mg by mouth once daily.    EScitalopram oxalate (LEXAPRO) 10 MG tablet Take 1 tablet (10 mg total) by mouth once daily.    risperiDONE (RISPERDAL) 1 MG tablet Take 1 tablet (1 mg total) by mouth 2 (two) times daily.    [DISCONTINUED] nicotine (NICODERM CQ) 21 mg/24 hr Place 1 patch onto the skin once daily.     Family History       Problem Relation (Age of Onset)    Anxiety disorder Mother          Tobacco Use    Smoking status: Every Day     Types: Vaping with nicotine    Smokeless tobacco: Never   Substance and Sexual Activity    Alcohol use: Yes     Alcohol/week: 2.0 standard drinks of alcohol     Types: 2 Cans of beer per week    Drug use: Yes     Types: Cocaine, Methamphetamines    Sexual activity: Yes     Partners: Male     Review of Systems   Constitutional:  Negative for fatigue and fever.   HENT:  Negative for congestion, ear pain and sore throat.    Eyes:  Negative for pain and discharge.   Respiratory:  Negative for cough, shortness of breath and wheezing.    Gastrointestinal:  Negative for abdominal pain, constipation, diarrhea, nausea and vomiting.   Endocrine: Negative for cold intolerance and heat intolerance.   Genitourinary:  Negative for difficulty urinating, dysuria and frequency.   Musculoskeletal:  Negative for arthralgias.   Allergic/Immunologic: Negative for environmental allergies.   Neurological:  Negative for dizziness, tremors and seizures.   Psychiatric/Behavioral:  Positive for agitation.         Delusional   All other systems reviewed and are negative.    Objective:     Vital Signs (Most Recent):  Temp:  97 °F (36.1 °C) (12/08/23 0753)  Pulse: 64 (12/08/23 0753)  Resp: 20 (12/08/23 0753)  BP: (!) 98/56 (12/08/23 0753)  SpO2: 100 % (12/08/23 0753) Vital Signs (24h Range):  Temp:  [97 °F (36.1 °C)-98.2 °F (36.8 °C)] 97 °F (36.1 °C)  Pulse:  [] 64  Resp:  [16-20] 20  SpO2:  [100 %] 100 %  BP: ()/(56-79) 98/56     Weight: 54.9 kg (121 lb)  Body mass index is 20.77 kg/m².     Physical Exam  Vitals and nursing note reviewed.   Constitutional:       Appearance: Normal appearance.   HENT:      Head: Normocephalic and atraumatic.      Nose: Nose normal.      Mouth/Throat:      Mouth: Mucous membranes are moist.      Pharynx: Oropharynx is clear.   Eyes:      Extraocular Movements: Extraocular movements intact.      Conjunctiva/sclera: Conjunctivae normal.      Pupils: Pupils are equal, round, and reactive to light.   Cardiovascular:      Rate and Rhythm: Normal rate and regular rhythm.      Pulses: Normal pulses.      Heart sounds: Normal heart sounds.   Pulmonary:      Effort: Pulmonary effort is normal.      Breath sounds: Normal breath sounds.   Abdominal:      General: Abdomen is flat. Bowel sounds are normal.      Palpations: Abdomen is soft.   Musculoskeletal:         General: Normal range of motion.      Cervical back: Normal range of motion and neck supple.   Skin:     General: Skin is warm and dry.      Capillary Refill: Capillary refill takes less than 2 seconds.      Comments: No rashes on limited skin exam.   Neurological:      General: No focal deficit present.      Mental Status: She is alert and oriented to person, place, and time.      Cranial Nerves: No cranial nerve deficit.      Comments: I Olfactory:  Sense of smell intact    II Optic:  Pupils equal round react to light.  Vision intact.    III, IV, VI, Ocular motor, Trochlear, Abducens:  Extraocular movements intact    V Trigeminal:  Facial sensation intact facial sensation intact,, muscles of mastication intact muscles of mastication intact,  corneal reflex intact, corneal reflex intact    VII Facial:  Muscles of facial expression intact     VIII Vestibular cochlear: Hearing intact vestibular cochlear: Hearing intact    IX Glossopharyngeal:  Gag reflex intact.  Tasting intact.     X Vagus:  Gag reflex intact.    XI Spinal Accessory:  Shoulder shrug intact.  Head rotation intact.    XII Hypoglossal:  Tongue movements intact.     Psychiatric:         Behavior: Behavior is agitated.         Thought Content: Thought content is delusional.         Judgment: Judgment is impulsive and inappropriate.              CRANIAL NERVES     CN III, IV, VI   Pupils are equal, round, and reactive to light.       Significant Labs: All pertinent labs within the past 24 hours have been reviewed.    Significant Imaging: I have reviewed all pertinent imaging results/findings within the past 24 hours.
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
## 2023-12-08 NOTE — H&P
[facility]) Hospital Medicine  History & Physical    Patient Name: Kristina Vizcarra  MRN: [id number]  Patient Class: IP- Psych  Admission Date: 12/7/2023  Attending Physician: Addy Byrd MD   Primary Care Provider: Virgie, Primary Doctor         Patient information was obtained from ER records.     Subjective:     Principal Problem:Substance-induced psychotic disorder    Chief Complaint:   Chief Complaint   Patient presents with    Mental Health Problem     Pt brought in by [facility] office with OPC by mother. States that she is using meth, delusional, and violent. Pt denies drug use. Pt brought in by [facility]          HPI:     Mental Health Problem        Pt brought in by [facility] office with OPC by mother. States that she is using meth, delusional, and violent. Pt denies drug use. Pt brought in by [facility]           History of Present Illness  38 y.o. female who was OPC'd by tamily due to paranoid, delusional behavior x 3 days. She reports recent methamphetamine use. Denies SI, HI, hallucinations    Past Medical History:   Diagnosis Date    Generalized anxiety disorder        History reviewed. No pertinent surgical history.    Review of patient's allergies indicates:  No Known Allergies    No current facility-administered medications on file prior to encounter.     Current Outpatient Medications on File Prior to Encounter   Medication Sig    sertraline (ZOLOFT) 25 MG tablet Take 25 mg by mouth once daily.    EScitalopram oxalate (LEXAPRO) 10 MG tablet Take 1 tablet (10 mg total) by mouth once daily.    risperiDONE (RISPERDAL) 1 MG tablet Take 1 tablet (1 mg total) by mouth 2 (two) times daily.    [DISCONTINUED] nicotine (NICODERM CQ) 21 mg/24 hr Place 1 patch onto the skin once daily.     Family History       Problem Relation (Age of Onset)    Anxiety disorder Mother          Tobacco Use    Smoking status: Every Day     Types:  Vaping with nicotine    Smokeless tobacco: Never   Substance and Sexual Activity    Alcohol use: Yes     Alcohol/week: 2.0 standard drinks of alcohol     Types: 2 Cans of beer per week    Drug use: Yes     Types: Cocaine, Methamphetamines    Sexual activity: Yes     Partners: Male     Review of Systems   Constitutional:  Negative for fatigue and fever.   HENT:  Negative for congestion, ear pain and sore throat.    Eyes:  Negative for pain and discharge.   Respiratory:  Negative for cough, shortness of breath and wheezing.    Gastrointestinal:  Negative for abdominal pain, constipation, diarrhea, nausea and vomiting.   Endocrine: Negative for cold intolerance and heat intolerance.   Genitourinary:  Negative for difficulty urinating, dysuria and frequency.   Musculoskeletal:  Negative for arthralgias.   Allergic/Immunologic: Negative for environmental allergies.   Neurological:  Negative for dizziness, tremors and seizures.   Psychiatric/Behavioral:  Positive for agitation.         Delusional   All other systems reviewed and are negative.    Objective:     Vital Signs (Most Recent):  Temp: 97 °F (36.1 °C) (12/08/23 0753)  Pulse: 64 (12/08/23 0753)  Resp: 20 (12/08/23 0753)  BP: (!) 98/56 (12/08/23 0753)  SpO2: 100 % (12/08/23 0753) Vital Signs (24h Range):  Temp:  [97 °F (36.1 °C)-98.2 °F (36.8 °C)] 97 °F (36.1 °C)  Pulse:  [] 64  Resp:  [16-20] 20  SpO2:  [100 %] 100 %  BP: ()/(56-79) 98/56     Weight: 54.9 kg (121 lb)  Body mass index is 20.77 kg/m².     Physical Exam  Vitals and nursing note reviewed.   Constitutional:       Appearance: Normal appearance.   HENT:      Head: Normocephalic and atraumatic.      Nose: Nose normal.      Mouth/Throat:      Mouth: Mucous membranes are moist.      Pharynx: Oropharynx is clear.   Eyes:      Extraocular Movements: Extraocular movements intact.      Conjunctiva/sclera: Conjunctivae normal.      Pupils: Pupils are equal, round, and reactive to light.    Cardiovascular:      Rate and Rhythm: Normal rate and regular rhythm.      Pulses: Normal pulses.      Heart sounds: Normal heart sounds.   Pulmonary:      Effort: Pulmonary effort is normal.      Breath sounds: Normal breath sounds.   Abdominal:      General: Abdomen is flat. Bowel sounds are normal.      Palpations: Abdomen is soft.   Musculoskeletal:         General: Normal range of motion.      Cervical back: Normal range of motion and neck supple.   Skin:     General: Skin is warm and dry.      Capillary Refill: Capillary refill takes less than 2 seconds.      Comments: No rashes on limited skin exam.   Neurological:      General: No focal deficit present.      Mental Status: She is alert and oriented to person, place, and time.      Cranial Nerves: No cranial nerve deficit.      Comments: I Olfactory:  Sense of smell intact    II Optic:  Pupils equal round react to light.  Vision intact.    III, IV, VI, Ocular motor, Trochlear, Abducens:  Extraocular movements intact    V Trigeminal:  Facial sensation intact facial sensation intact,, muscles of mastication intact muscles of mastication intact, corneal reflex intact, corneal reflex intact    VII Facial:  Muscles of facial expression intact     VIII Vestibular cochlear: Hearing intact vestibular cochlear: Hearing intact    IX Glossopharyngeal:  Gag reflex intact.  Tasting intact.     X Vagus:  Gag reflex intact.    XI Spinal Accessory:  Shoulder shrug intact.  Head rotation intact.    XII Hypoglossal:  Tongue movements intact.     Psychiatric:         Behavior: Behavior is agitated.         Thought Content: Thought content is delusional.         Judgment: Judgment is impulsive and inappropriate.              CRANIAL NERVES     CN III, IV, VI   Pupils are equal, round, and reactive to light.       Significant Labs: All pertinent labs within the past 24 hours have been reviewed.    Significant Imaging: I have reviewed all pertinent imaging results/findings  within the past 24 hours.  Assessment/Plan:     * Substance-induced psychotic disorder  To be admitted to our inpatient psychiatric unit for further evaluation and management.        Amphetamine abuse  Recommend cessation. Will monitor for signs of withdrawal.     Paranoid delusion  To be admitted to our inpatient psychiatric unit for further evaluation and management.        VTE Risk Mitigation (From admission, onward)      None                         [facility] ([facility])  Adult Nutrition  Consult Note    SUMMARY     RD consulted for new admit. Spoke with RN. RN stated that patient has no dietary issues at this time. RD to sign off. Please reconsult if any dietary/nutrition issues arise.    Shamir Falcon Jr, MD  Department of Hospital Medicine  [facility]

## 2023-12-08 NOTE — HPI
Mental Health Problem        Pt brought in by [facility] office with OPC by mother. States that she is using meth, delusional, and violent. Pt denies drug use. Pt brought in by Sergeant Celso Chappell of [facility]           History of Present Illness  38 y.o. female who was OPC'd by sofia due to paranoid, delusional behavior x 3 days. She reports recent methamphetamine use. Denies SI, HI, hallucinations

## 2023-12-08 NOTE — PLAN OF CARE
POC reviewed this shift and is on going. Patient is withdrawn, calm, cooperative, quiet, anxious, and sleeping. Denies Suicidal Ideation, Homicidal Ideation, Auditory Hallucinations, Visual Hallucinations, Tactile Hallucinations, Gustatory Hallucinations, and Delusions. Patient was out on the unit floor for meals and snacks. Patient isolated to her room most of the morning. Patient participated in the group session that was conducted today. Pt continues to be medication compliant and staff will continue to monitor pt closely while on the unit.
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
## 2023-12-08 NOTE — ED NOTES
NEUROLOGICAL:   Patient is awake , alert , and oriented x 4 . Pupils are PERRL. Gait is steady.   Moves all extremities without difficulty.   Patient reports no neuro complaints. Here under an OPC filled out by her mother.  GCS 15    HEENT:   Head appears normocephalic  and symmetric .   Eyes appear WNL to both eyes. Patient reports no complaints  to both eyes .   Ears appear WNL. Patient reports no complaints  to both ears.   Nares appear patent . Patient reports no nose complaints .  Mouth appears moist, pink, and teeth intact. Patient reports no mouth complaints.   Throat appears pink and moist . Patient reports no throat complaints.    CARDIOVASCULAR:   Pulse is elevated to 120.   On palpation no edema noted , noted to none.   Patient reports no CV complaints.  .   Patient vitals are NWNL.    RESPIRATORY:   Airway Clear, Open, and Patent.  Respirations are even and unlabored.   Breath sounds clear  to all lung fields.   Patient reports no respiratory complaints.     GASTROINTESTINAL:   Abdomen is soft  and non-tender x 4 quadrants. Bowel sounds are normoactive to all quadrants .   Patient reports no GI complaints .     GENITOURINARY:   Patient reports no  complaints.     MUSCULOSKELETAL:   full range of motion to all extremities, no swelling noted , no tenderness noted, and no weakness noted.   Patient reports no musculoskeletal complaints     SKIN:   Skin appears warm , dry , good turgor, color normal for race, and intact. Patient reports no skin complaints.

## 2023-12-08 NOTE — PLAN OF CARE
Behavioral Health Unit  Psychosocial History and Assessment  Progress Note      Patient Name: Kristina Vizcarra YOB: 1985 SW: Marilee Nelson, WOODY  Date: 12/8/2023    Chief Complaint: psychosis    Consent:     Did the patient consent for an interview with the ? Yes    Did the patient consent for the  to contact family/friend/caregiver?   Yes  Name: Day Watkins, Relationship: mother, and Contact: [phone]    Did the patient give consent for the  to inform family/friend/caregiver of his/her whereabouts or to discuss discharge planning? Yes    Source of Information: Face to face with patient, Telephone interview with family/friend/caregiver, and Chart review    Is information obtained from interviews considered reliable?   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       History of Present Illness - (Patient Perception):   My mother put me here. My relationship with my mother is why I'm here.     History of Present Illness - (Perception of Others): She rambles about stuff til its like she has a nervous breakdown, she does meth sometimes and its not good for her mental health, she needs to get on medication and stay on it. She disappeared from me about a week ago, she was doing good   according to Day Watkins    Present biopsychosocial functioning:Per MD Note, Pt is a 38 y.o. female with a past psychiatric history of psychosis and substance use currently admitted to the inpatient unit with the following chief complaint: psychosis.  Pt denies SI/HI/AVH. Pt UDS was positive for amphetamine. Pt is unemployed. Pt lives with family. Pt has 1 son that resides with his father. Pt reports intact relationship with siblings. Pt can perform all ADLs and IADLs.  Pt reports outpatient psychiatric care with [facility]. Pt reports most recent stressor as relationship/conflict with her mother.     Past biopsychosocial functioning: Per chart review, 1 previous inpatient treatment at [facility] during May of 2022.     Family and Marital/Relationship History:     Significant Other/Partner Relationships:  Single    Family Relationships: Intact except with mother      Childhood History:     [place] was [place] raised[place]     Who raised the patient? Parents       How does patient describe their childhood? Normal       Who is patient's primary support person? Cesar Watkins, brother       Culture and Zoroastrian:     Zoroastrian: Non-Adventism    How strong of a role does Hinduism and spirituality play in patient's life? Strong     Yazidi or spiritual concerns regarding treatment: not applicable     History of Abuse:   History of Abuse: Denies      Psychiatric and Medical History:     History of psychiatric illness or treatment: prior inpatient treatment, current under psychiatric care     Medical history:   Past Medical History:   Diagnosis Date    Generalized anxiety disorder        Substance Abuse History:     Alcohol - (Patient Perspective):   Social History     Substance and Sexual Activity   Alcohol Use Not Currently       Alcohol - (Collateral Perspective): None according to Day Watkins     Drugs - (Patient Perspective):   Social History     Substance and Sexual Activity   Drug Use Yes    Types: Amphetamines       Drugs - (Collateral Perspective): Meth but not daily according to Day Watkins     Education:     Currently Enrolled? No  Associate/Bachelor Degree    Special Education? No    Interested in Completing Education/GED: No    Employment and Financial:     Currently employed? Unemployed    Source of Income:  none     Able to afford basic needs (food, shelter, utilities)? Pt provides food for herself. Family provides shelter and utilities     Who manages  finances/personal affairs? Not applicable       Service:     ? no    Combat Service? No     Community Resources:     Describe present use of community resources: [facility]      Identify previously used community resources   (Include previous mental health treatment - outpatient and inpatient): Per chart review, 1 previous inpatient treatment at [facility] during May of 2022.     Environmental:     Current living situation:Lives with family, Lives in home    Social Evaluation:     Patient Assets: Capable of independent living, Work skills, and Communicable skills    Patient Limitations: unemployed, lacks insight into substance abuse     High risk psychosocial issues that may impact discharge planning:   None noted.     Recommendations:     Anticipated discharge plan:   outpatient follow up, home with family    High risk issues requiring early treatment planning and immediate intervention: psychosis     Community resources needed for discharge planning:  aftercare treatment sources    Anticipated social work role(s) in treatment and discharge planning: SW will facilitate process groups to assist pt develop healthy coping skills; CM will arrange outpatient follow-up appointments and assist with discharge planning.

## 2023-12-09 LAB
CHOLEST SERPL-MCNC: 125 MG/DL (ref 120–199)
CHOLEST/HDLC SERPL: 2 {RATIO} (ref 2–5)
ESTIMATED AVG GLUCOSE: 91 MG/DL (ref 68–131)
HBA1C MFR BLD: 4.8 % (ref 4–5.6)
HDLC SERPL-MCNC: 61 MG/DL (ref 40–75)
HDLC SERPL: 48.8 % (ref 20–50)
LDLC SERPL CALC-MCNC: 55.2 MG/DL (ref 63–159)
NONHDLC SERPL-MCNC: 64 MG/DL
TRIGL SERPL-MCNC: 44 MG/DL (ref 30–150)

## 2023-12-09 PROCEDURE — 99232 SBSQ HOSP IP/OBS MODERATE 35: CPT | Mod: AF,HB,, | Performed by: PSYCHIATRY & NEUROLOGY

## 2023-12-09 PROCEDURE — 25000003 PHARM REV CODE 250: Performed by: INTERNAL MEDICINE

## 2023-12-09 PROCEDURE — 11400000 HC PSYCH PRIVATE ROOM

## 2023-12-09 PROCEDURE — 36415 COLL VENOUS BLD VENIPUNCTURE: CPT | Performed by: INTERNAL MEDICINE

## 2023-12-09 PROCEDURE — 90833 PR PSYCHOTHERAPY W/PATIENT W/E&M, 30 MIN (ADD ON): ICD-10-PCS | Mod: AF,HB,, | Performed by: PSYCHIATRY & NEUROLOGY

## 2023-12-09 PROCEDURE — 99232 PR SUBSEQUENT HOSPITAL CARE,LEVL II: ICD-10-PCS | Mod: AF,HB,, | Performed by: PSYCHIATRY & NEUROLOGY

## 2023-12-09 PROCEDURE — 90833 PSYTX W PT W E/M 30 MIN: CPT | Mod: AF,HB,, | Performed by: PSYCHIATRY & NEUROLOGY

## 2023-12-09 RX ADMIN — SERTRALINE HYDROCHLORIDE 25 MG: 25 TABLET ORAL at 08:12

## 2023-12-09 NOTE — CARE UPDATE
The patient's sister was reached at [phone]. Her name is Cate NewbyrockyksCaseyCollado.She stated that she will most definitely  the patient tomorrow at Noon. The patient is angry that her mother put her in the hospital only because the patient missed one appointment with the patient's doctor. The patient's mother did the OPC. Cate Jackson stated that she herself will call the hospital when she arrives and will be waiting down stairs. The patient's sister stated that the patient will be living right next door in [place] to her sister. The patient will now be living, upon discharge, with her brother and the patient's mother is not involved in any way with the patient's residence.     The patient's sister stated that if any additional information is needed please do not hesitate to contact her; she has been in daily contact with the patient and spoke to the patient last night and agrees that the patient is ready to go tomorrow.         Plan    The discharge specialist will be preparing the patient for discharge tomorrow.

## 2023-12-09 NOTE — PLAN OF CARE
POC reviewed this shift and is on going. Patient is calm, cooperative, quiet, and anxious. Denies Suicidal Ideation, Homicidal Ideation, Auditory Hallucinations, Visual Hallucinations, Tactile Hallucinations, Gustatory Hallucinations, and Delusions. Patient was out on the unit this morning for a while watching TV with her peers. Patient is a good candidate to be discharged tomorrow. Pt continues to be medication compliant and staff will continue to monitor pt closely while on the unit.

## 2023-12-09 NOTE — PROGRESS NOTES
"PSYCHIATRY DAILY INPATIENT PROGRESS NOTE  SUBSEQUENT HOSPITAL VISIT    ENCOUNTER DATE: 12/9/2023  SITE: [facility]    DATE OF ADMISSION: 12/7/2023  5:24 PM  LENGTH OF STAY: 2 days      CHIEF COMPLAINT   Kristina Vizcarra is a 38 y.o. female, seen during daily gonzalez rounds on the inpatient unit.  Kristina Vizcarra presented with the chief complaint of  psychosis, "My mom OPC'd me."       The patient was seen and examined. The chart was reviewed.     Reviewed notes from Rns, MD, and SW and labs from the last 24 hours.    The patient's case was discussed with the treatment team/care providers today including Rns    Staff reports no behavioral or management issues.     The patient has been compliant with treatment.      Subjective 12/09/2023       Today the patient reports that she is doing well. She reports that she had a positive conversation with her sister.   She can identify positive social support and coping.    SHe continues to refute her mother's/the OPC's accusations.    The OPC reports paranoia- the patient denied this; no paranoid behavior was exhibited on the unit thus far   OPC reports violent behaviors- she has been calm and cooperative; she displayed no violence or agressiveness or agitation since admission.    The OPC reports Meth use: UDS was positive for amphetamines; the patient admits to using them; she reports that she did use them in the last week; she reports that she uses them about twice per month; She declined rehab; she is open to attending counseling at [facility]   She denied withdrawals or cravings.    She is hopeful, future oriented and goal directed.    She appears to be stable; will continue to monitor for 24 hours to ensure stability given the severity of reported abnormal behaviors.    Collateral form her sister is pending.      The patient denies any side effects to medications.          Psychiatric ROS (observed, reported, or endorsed/denied):  Depressed mood - " denies  Interest/pleasure/anhedonia: denies  Guilt/hopelessness/worthlessness - denies  Changes in Sleep - denies  Changes in Appetite - denies  Changes in Concentration - denies  Changes in Energy - denies  PMA/R- denies  Suicidal- active/passive ideations - denies  Homicidal ideations: active/passive ideations - denies    Hallucinations - denies  Delusions - denies  Disorganized behavior - denies  Disorganized speech - denies  Negative symptoms - denies    Elevated mood - denies  Decreased need for sleep - denies  Grandiosity - denies  Racing thoughts - denies  Impulsivity - denies  Irritability- denies  Increased energy - denies  Distractibility - denies  Increase in goal-directed activity or PMA- denies    Symptoms of ARABELLA - denies  Symptoms of Panic Disorder- denies  Symptoms of PTSD - denies        Overall progress: Patient is showing significant improvement        Psychotherapy:  Target symptoms: substance abuse, psychosis  Why chosen therapy is appropriate versus another modality: relevant to diagnosis, patient responds to this modality, evidence based practice  Outcome monitoring methods: self-report, observation  Therapeutic intervention type: insight oriented psychotherapy, behavior modifying psychotherapy, supportive psychotherapy, interactive psychotherapy  Topics discussed/themes: building skills sets for symptom management, symptom recognition  The patient's response to the intervention is accepting. The patient's progress toward treatment goals is good.   Duration of intervention: 16 minutes.        Medical ROS  General ROS: negative  Ophthalmic ROS: negative  ENT ROS: negative  Allergy and Immunology ROS: negative  Hematological and Lymphatic ROS: negative  Endocrine ROS: negative  Respiratory ROS: no cough, shortness of breath, or wheezing  Cardiovascular ROS: no chest pain or dyspnea on exertion  Gastrointestinal ROS: no abdominal pain, change in bowel habits, or black or bloody  "stools  Genito-Urinary ROS: no dysuria, trouble voiding, or hematuria  Musculoskeletal ROS: negative  Neurological ROS: no TIA or stroke symptoms  Dermatological ROS: negative    PAST MEDICAL HISTORY   Past Medical History:   Diagnosis Date    Generalized anxiety disorder            PSYCHOTROPIC MEDICATIONS   Scheduled Meds:   sertraline  25 mg Oral Daily     Continuous Infusions:  PRN Meds:.acetaminophen, aluminum-magnesium hydroxide-simethicone, benzonatate, benztropine mesylate, hydrOXYzine pamoate, loperamide, nicotine, OLANZapine **AND** OLANZapine, ondansetron, promethazine        EXAMINATION    VITALS   Vitals:    12/07/23 2315 12/08/23 0753 12/08/23 1929 12/09/23 0736   BP: 101/64 (!) 98/56 102/62 96/60   BP Location:  Left arm Left arm Left arm   Patient Position:  Sitting Sitting Sitting   Pulse: 63 64 (!) 16 62   Resp: 16 20 (!) 62 18   Temp: 98.2 °F (36.8 °C) 97 °F (36.1 °C) 98.3 °F (36.8 °C) 98.2 °F (36.8 °C)   TempSrc:  Oral Oral Oral   SpO2:  100% 99% 97%   Weight: 54.9 kg (121 lb)      Height: 5' 4" (1.626 m)          Body mass index is 20.77 kg/m².      CONSTITUTIONAL  General Appearance: unremarkable, age appropriate     MUSCULOSKELETAL  Muscle Strength and Tone:no dyskinesia, no dystonia, no tremor, no tic  Abnormal Involuntary Movements: No  Gait and Station: non-ataxic     PSYCHIATRIC   Level of Consciousness: awake and alert   Orientation: person, place, time, and situation  Grooming: Casually dressed and Well groomed  Psychomotor Behavior: normal, cooperative, friendly and cooperative, eye contact normal, no PMA/R  Speech: normal tone, normal rate, normal pitch, normal volume, spontaneous  Language: grossly intact, able to name, able to repeat  Mood: anxious  Affect: Anxious, Consistent with mood, and Congruent with thought  Thought Process: linear, logical  Associations: intact   Thought Content: denies SI and denies HI  Perceptions: denies AH and denies  VH  Memory: Able to recall past " events, Remote intact, and Recent intact  Attention:Normal and Attends to interview without distraction  Fund of Knowledge: Aware of current events and Vocabulary appropriate   Estimate if Intelligence:  Average based on work/education history, vocabulary and mental status exam  Insight: intact, has awareness of illness  Judgment: behavior is adequate to circumstances, age appropriate      DIAGNOSTIC TESTING   Laboratory Results  No results found for this or any previous visit (from the past 24 hour(s)).          MEDICAL DECISION MAKING      ASSESSMENT:   Amphetamine Induced Psychotic disorder  ARABELLA  H/o OCD     Psychosocial stressors     Amphetamine Abuse  Nicotine Dependence         PROBLEM LIST AND MANAGEMENT PLANS     Psychosis: pt counseled  -symptoms were likely substance induced and appear improved  Continue to improve Monitor for now  -continue Zyprexa prn     ARABELLA/OCD: pt counseled  -resumed/continue home ZOloft 25 mg po q day     Psychosocial stressors: pt counseled  -SW consulted to assist with resources     Amphetamine Abuse: pt counseled  -Pt declined rehab  -she is open to outpatient counseling      Nicotine Dependence: pt counseled  -started/continue nicotine 14 mg patch dermal q day           Discussed diagnosis, risks and benefits of proposed treatment vs alternative treatments vs no treatment, potential side effects of these treatments and the inherent unpredictability of treatment. The patient expresses understanding of the above and displays the capacity to agree with this treatment given said understanding. Patient also agrees that, currently, the benefits outweigh the risks and would like to pursue/continue treatment at this time.    Any medications being used off-label were discussed with the patient inclusive of the evidence base for the use of the medications and consent was obtained for the off-label use of the medication.       DISCHARGE PLANNING  Expected Disposition Plan: Home or Self  Care- discharge home tomorrow if stable      NEED FOR CONTINUED HOSPITALIZATION  Psychiatric illness continues to pose a potential threat to life or bodily function, of self or others, thereby requiring the need for continued inpatient psychiatric hospitalization: Yes, due to: significant psychotic thought disorder and aggressive behavior, as evidenced by:  Ongoing concerns with perceptual aberrancy and paranoid persecutory delusions leading to potential harm of self or others. Per reports    Protective inpatient pyschiatric hospitalization required while a safe disposition plan is enacted: Yes    Patient stabilized and ready for discharge from inpatient psychiatric unit: No        STAFF:   Addy Byrd MD  Psychiatry

## 2023-12-10 VITALS
HEIGHT: 64 IN | TEMPERATURE: 98 F | WEIGHT: 121 LBS | BODY MASS INDEX: 20.66 KG/M2 | DIASTOLIC BLOOD PRESSURE: 52 MMHG | RESPIRATION RATE: 18 BRPM | HEART RATE: 69 BPM | OXYGEN SATURATION: 99 % | SYSTOLIC BLOOD PRESSURE: 93 MMHG

## 2023-12-10 PROBLEM — F22 PARANOID DELUSION: Status: RESOLVED | Noted: 2022-05-28 | Resolved: 2023-12-10

## 2023-12-10 PROCEDURE — 90833 PR PSYCHOTHERAPY W/PATIENT W/E&M, 30 MIN (ADD ON): ICD-10-PCS | Mod: AF,HB,, | Performed by: PSYCHIATRY & NEUROLOGY

## 2023-12-10 PROCEDURE — 90833 PSYTX W PT W E/M 30 MIN: CPT | Mod: AF,HB,, | Performed by: PSYCHIATRY & NEUROLOGY

## 2023-12-10 PROCEDURE — 25000003 PHARM REV CODE 250: Performed by: INTERNAL MEDICINE

## 2023-12-10 PROCEDURE — 99239 PR HOSPITAL DISCHARGE DAY,>30 MIN: ICD-10-PCS | Mod: AF,HB,, | Performed by: PSYCHIATRY & NEUROLOGY

## 2023-12-10 PROCEDURE — 99239 HOSP IP/OBS DSCHRG MGMT >30: CPT | Mod: AF,HB,, | Performed by: PSYCHIATRY & NEUROLOGY

## 2023-12-10 RX ADMIN — SERTRALINE HYDROCHLORIDE 25 MG: 25 TABLET ORAL at 08:12

## 2023-12-10 NOTE — PLAN OF CARE
POC reviewed and is ongoing.  Pt cooperates with staff and denies SI, HI, A/V hallucinations. Had snack. No ss of distress.

## 2023-12-10 NOTE — PLAN OF CARE
POC reviewed this shift and is ongoing.  Pt in her room sleep on start of shift. On unit as needed. Interacts with peers and cooperative with staff. No ss of distress. Denies SI, HI, A/V hallucination.

## 2023-12-10 NOTE — PROGRESS NOTES
Psychotherapy:  Target symptoms: substance abuse, psychosis  Why chosen therapy is appropriate versus another modality: relevant to diagnosis, patient responds to this modality, evidence based practice  Outcome monitoring methods: self-report, observation  Therapeutic intervention type: insight oriented psychotherapy, behavior modifying psychotherapy, supportive psychotherapy, interactive psychotherapy  Topics discussed/themes: building skills sets for symptom management, symptom recognition  Safety planning and wrap up session  The patient's response to the intervention is accepting. The patient's progress toward treatment goals is good.   Duration of intervention: 16 minutes.    Addy Byrd MD  Psychiatry
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
